# Patient Record
Sex: MALE | Race: WHITE | Employment: FULL TIME
[De-identification: names, ages, dates, MRNs, and addresses within clinical notes are randomized per-mention and may not be internally consistent; named-entity substitution may affect disease eponyms.]

---

## 2022-08-05 ENCOUNTER — TRANSCRIBE ORDERS (OUTPATIENT)
Dept: OTHER | Age: 52
End: 2022-08-05

## 2022-08-05 DIAGNOSIS — N52.9 ERECTILE DYSFUNCTION, UNSPECIFIED ERECTILE DYSFUNCTION TYPE: Primary | ICD-10-CM

## 2022-10-26 ENCOUNTER — OFFICE VISIT (OUTPATIENT)
Dept: UROLOGY | Facility: CLINIC | Age: 52
End: 2022-10-26
Payer: COMMERCIAL

## 2022-10-26 DIAGNOSIS — N52.9 ERECTILE DYSFUNCTION, UNSPECIFIED ERECTILE DYSFUNCTION TYPE: ICD-10-CM

## 2022-10-26 PROCEDURE — 99203 OFFICE O/P NEW LOW 30 MIN: CPT | Mod: 25 | Performed by: UROLOGY

## 2022-10-26 PROCEDURE — 51798 US URINE CAPACITY MEASURE: CPT | Performed by: UROLOGY

## 2022-10-26 RX ORDER — LIDOCAINE 50 MG/G
PATCH TOPICAL
COMMUNITY
Start: 2022-07-18

## 2022-10-26 RX ORDER — ATORVASTATIN CALCIUM 40 MG/1
20 TABLET, FILM COATED ORAL EVERY MORNING
COMMUNITY
Start: 2021-11-22

## 2022-10-26 RX ORDER — METOPROLOL TARTRATE 25 MG/1
0.5 TABLET, FILM COATED ORAL 2 TIMES DAILY
COMMUNITY
Start: 2022-01-21

## 2022-10-26 RX ORDER — LISINOPRIL 40 MG/1
20 TABLET ORAL EVERY MORNING
COMMUNITY
Start: 2021-11-22

## 2022-10-26 NOTE — NURSING NOTE
Jaime Anderson's goals for this visit include:   Chief Complaint   Patient presents with     New Patient     IPP consult, appt per pt       He requests these members of his care team be copied on today's visit information:       PCP: No Ref-Primary, Physician    Referring Provider:  Tor Sánchez  [address] [place] [id number]    There were no vitals taken for this visit.    Do you need any medication refills at today's visit?     Kinga Branch LPN on 10/26/2022 at 1:02 PM

## 2022-10-26 NOTE — PROGRESS NOTES
I am seeing Jaime Anderson in consultation from [facility] for evaluation of erectile dysfunction, referral for IPP placement.    HPI:  Jaime Anderson is a 52 year old male is a very nice man with complaints of erectile dysfunction for the last several years.  He's tried sildenafil, no response at all.  Cialis caused bad GERD.   Has been doing ICI, works well, but does not like the needles.    ED/Vascular disease risk factors:  HTN: yes  Hyperlipidemia: nl  Smoking: never.  DM: yes, last A1c 7  Cardiovascular disease: None known  Meds associated with ED that he's taking: metoprolol for history of SVT.  No coronary artery disease.  Anxiety/anger/depression:  No  Penile Plaques or curvature: minimal. Nothing new.      REVIEW OF SYSTEMS:  General: negative  Skin: negative  Eyes: negative  Ears/Nose/Throat: negative  Respiratory: negative  Cardiovascular: negative  Gastrointestinal: negative  Genitourinary: see HPI  Musculoskeletal: negative  Neurologic: negative  Psychiatric: negative  Hematologic/Lymphatic/Immunologic: negative  Endocrine: negative    PAST MEDICAL HX:  DM II, metformin    PAST SURG HX:  Past Surgical History:   Procedure Laterality Date     APPENDECTOMY       OPEN REDUCTION INTERNAL FIXATION ANKLE  11/29/2010    OPEN REDUCTION INTERNAL FIXATION ANKLE performed by PAMELA BENITEZ at [facility]    no history of inguinal hernia repair.  Has small umbilical hernia.    FAMILY HX:  No family history on file.    SOCIAL HX:  Social History     Tobacco Use     Smoking status: Never       MEDICATIONS:  Current Outpatient Medications   Medication Sig     Alprostadil, Vasodilator, 40 MCG SOLR      atorvastatin (LIPITOR) 40 MG tablet 20 mg     empagliflozin (JARDIANCE) 25 MG TABS tablet Take 0.5 tablets by mouth daily     lidocaine (LIDODERM) 5 % patch APPLY 1 PATCH TOPICALLY AS NEEDED FOR PAIN. WEAR FOR ONLY 12 HOURS THEN REMOVE FOR 12 HOURS.     lisinopril (ZESTRIL) 40 MG tablet 20 mg     LISINOPRIL 10 MG OR TABS  ONE DAILY     metFORMIN (GLUCOPHAGE) 1000 MG tablet 1,000 mg     metoprolol tartrate (LOPRESSOR) 25 MG tablet Take 0.5 tablets by mouth 2 times daily     Psyllium 33 % POWD TAKE 1 TABLESPOONFUL BY MOUTH EVERY DAY MIXED IN JUICE OR WATER AS DIRECTED FOR FIBER     semaglutide (OZEMPIC) 2 MG/1.5ML SOPN pen INJECT 0.5MG UNDER THE SKIN EVERY WEEK     No current facility-administered medications for this visit.       ALLERGIES:  Tuberculin purified protein derivative      GENERAL PHYSICAL EXAM:   Constitutional: No acute distress. Well nourished.   PSYCH: normal mood and affect.  NEURO: normal gait, no focal deficits.   EYES: anicteric, EOMI, PERR.  CARDIOPULMONARY: breathing non-labored, pulse regular rate/rhythm, no peripheral edema.  GI: Abdomen soft, non-tender, nondistended   MUSCULOSKELETAL: normal limb proportions, no muscle wasting, no contractures.  SKIN: Normal virilized hair distribution, no lesions, warts or rashes over genitalia, abdomen extremities or face.  HEME/LYMPH: no ecchymosis, no lymphadenopathy in groin, no lymphedema.     EXAM:  Phallus uncircumcised, meatus adequate, no plaques palpated.   Left testis descended , size is normal , consistency is normal . No intra-testicular masses.   Right testis descended , size is normal, consistency is normal . No intra-testicular masses.   Epididymes present, non-tender, not-enlarged.   Cord structures not remarkable.     Imaging/labs:  N/A     ASSESSMENT:     Erectile dysfunction.    PLAN:    I counseled the patient on the risks of penile implant surgery. These include, but are not limited to infection, scarring, penile shortening, damage to urethra and bladder, pain and erosion. I explained to him the risk of infection and the need for explantation in those cases; that other treatments for ED cannot be used after placing an implant, and that implants have a mechanical failure rate.  Discussed possible ectopic reservoir, possible glans necrosis/ tissue loss.   I answered all of his questions to the best of my ability and to the patient's satisfaction.     He would like to schedule IPP.  Surgery orders placed.    PVR 109cc today. No history of inguinal hernia repair.        Copied cc to Consulting provider [facility]  Rashad Sánchez    Thank-you for the kind consultation.  Nathanael Pugh MD     Urological Surgeon

## 2022-10-26 NOTE — LETTER
10/26/2022       RE: Jaime Anderson  [address] [place] [address]     Dear Colleague,    Thank you for referring your patient, Jaime Anderson, to the [facility]. Please see a copy of my visit note below.      I am seeing Jaime Anderson in consultation from [facility] for evaluation of erectile dysfunction, referral for IPP placement.    HPI:  Jaime Anderson is a 52 year old male is a very nice man with complaints of erectile dysfunction for the last several years.  He's tried sildenafil, no response at all.  Cialis caused bad GERD.   Has been doing ICI, works well, but does not like the needles.    ED/Vascular disease risk factors:  HTN: yes  Hyperlipidemia: nl  Smoking: never.  DM: yes, last A1c 7  Cardiovascular disease: None known  Meds associated with ED that he's taking: metoprolol for history of SVT.  No coronary artery disease.  Anxiety/anger/depression:  No  Penile Plaques or curvature: minimal. Nothing new.      REVIEW OF SYSTEMS:  General: negative  Skin: negative  Eyes: negative  Ears/Nose/Throat: negative  Respiratory: negative  Cardiovascular: negative  Gastrointestinal: negative  Genitourinary: see HPI  Musculoskeletal: negative  Neurologic: negative  Psychiatric: negative  Hematologic/Lymphatic/Immunologic: negative  Endocrine: negative    PAST MEDICAL HX:  DM II, metformin    PAST SURG HX:  Past Surgical History:   Procedure Laterality Date     APPENDECTOMY       OPEN REDUCTION INTERNAL FIXATION ANKLE  11/29/2010    OPEN REDUCTION INTERNAL FIXATION ANKLE performed by PAMELA BENITEZ at [facility]    no history of inguinal hernia repair.  Has small umbilical hernia.    FAMILY HX:  No family history on file.    SOCIAL HX:  Social History     Tobacco Use     Smoking status: Never       MEDICATIONS:  Current Outpatient Medications   Medication Sig     Alprostadil, Vasodilator, 40 MCG SOLR      atorvastatin (LIPITOR)  40 MG tablet 20 mg     empagliflozin (JARDIANCE) 25 MG TABS tablet Take 0.5 tablets by mouth daily     lidocaine (LIDODERM) 5 % patch APPLY 1 PATCH TOPICALLY AS NEEDED FOR PAIN. WEAR FOR ONLY 12 HOURS THEN REMOVE FOR 12 HOURS.     lisinopril (ZESTRIL) 40 MG tablet 20 mg     LISINOPRIL 10 MG OR TABS ONE DAILY     metFORMIN (GLUCOPHAGE) 1000 MG tablet 1,000 mg     metoprolol tartrate (LOPRESSOR) 25 MG tablet Take 0.5 tablets by mouth 2 times daily     Psyllium 33 % POWD TAKE 1 TABLESPOONFUL BY MOUTH EVERY DAY MIXED IN JUICE OR WATER AS DIRECTED FOR FIBER     semaglutide (OZEMPIC) 2 MG/1.5ML SOPN pen INJECT 0.5MG UNDER THE SKIN EVERY WEEK     No current facility-administered medications for this visit.       ALLERGIES:  Tuberculin purified protein derivative      GENERAL PHYSICAL EXAM:   Constitutional: No acute distress. Well nourished.   PSYCH: normal mood and affect.  NEURO: normal gait, no focal deficits.   EYES: anicteric, EOMI, PERR.  CARDIOPULMONARY: breathing non-labored, pulse regular rate/rhythm, no peripheral edema.  GI: Abdomen soft, non-tender, nondistended   MUSCULOSKELETAL: normal limb proportions, no muscle wasting, no contractures.  SKIN: Normal virilized hair distribution, no lesions, warts or rashes over genitalia, abdomen extremities or face.  HEME/LYMPH: no ecchymosis, no lymphadenopathy in groin, no lymphedema.     EXAM:  Phallus uncircumcised, meatus adequate, no plaques palpated.   Left testis descended , size is normal , consistency is normal . No intra-testicular masses.   Right testis descended , size is normal, consistency is normal . No intra-testicular masses.   Epididymes present, non-tender, not-enlarged.   Cord structures not remarkable.     Imaging/labs:  N/A     ASSESSMENT:     Erectile dysfunction.    PLAN:    I counseled the patient on the risks of penile implant surgery. These include, but are not limited to infection, scarring, penile shortening, damage to urethra and bladder,  pain and erosion. I explained to him the risk of infection and the need for explantation in those cases; that other treatments for ED cannot be used after placing an implant, and that implants have a mechanical failure rate.  Discussed possible ectopic reservoir, possible glans necrosis/ tissue loss.  I answered all of his questions to the best of my ability and to the patient's satisfaction.     He would like to schedule IPP.  Surgery orders placed.    PVR 109cc today. No history of inguinal hernia repair.        Copied cc to Consulting provider [facility]  Rashad Sánchez    Thank-you for the kind consultation.  Nathanael Pugh MD     Urological Surgeon       Again, thank you for allowing me to participate in the care of your patient.      Sincerely,    Nathanael Pugh MD

## 2022-10-26 NOTE — PATIENT INSTRUCTIONS
Surgery Instructions    Always follow your surgeon s instructions. If you don t, your surgery could be cancelled. Please use the following checklist.  Your surgery is on: The surgery scheduler will contact you within 1 week of your consult with the surgeon. If you do not hear from them, please call the clinic or RN Care Coordinator for your provider.    Time: Prearrival times can vary depending on location/type of surgery.  [place] - 2 hour pre-arrival  [facility] - 2 hour pre-arrival  [place] - 1 hour pre-arrival    Note:  These times may change. A nurse will call you before surgery to confirm. If you have not received a call or if you have more questions, please call us on the working day before your surgery:  [place]: [phone] or [phone] (9am to 5:30pm)  [facility]: [phone] (8am to 6pm)  [place]: [phone] (9am to 5pm)  [facility] [phone] (7am to 4pm)  Prior to surgery  Have a pre-op physical exam with your Primary Doctor within 30 days of surgery  Ask your doctor to send all of your results to the surgery center/hospital before surgery. Your doctor also may ask you to bring the results with you on the day of surgery.  Tell your doctor if:  You are allergic to latex or rubber (latex and rubber gloves are often used in medical care).  You are taking any medicines (including aspirin), vitamins, or herbal products. You may need to stop taking some medicines before surgery.  You have any medical problems (allergies, diabetes, or heart disease, for example).  You have a pacemaker or an AICD (automatic implanted cardiac defibrillator). If you do, please bring the ID card with you on the day of surgery.  People who smoke have a higher risk of infection after surgery. Ask your doctor how you can quit smoking.  If you Primary Doctor is not within the [email or web address] system, you will need to have your pre-op physical faxed to us to be scanned into your chart.  [place]: [phone]  or [phone]  [facility] [phone]  [facility]): [phone]  Complete pre-procedure COVID-19 Testing  What kind of COVID-19 test should I have?  At-home rapid antigen test  You must have a rapid-antigen test 1 to 2 days before your procedure (surgery) if you are:   - Over age 2 and   - Planning to go home the same day as your procedure (surgery)  Rapid antigen tests are not recommended for children age 2 and under. These patients should schedule a PCR test unless you have otherwise discussed performing an at-home antigen test with your surgeon.  You can buy this test at many pharmacy stores. Or, you may order a free test at covid.gov/tests.  If your test is negative: please take a photo of the test. Show the nurse the photo when you come in for your procedure. We can't accept rapid antigen tests that are more than 2 days old.  PCR lab test   You must have a PCR test in a lab within 4 days of your procedure (surgery) if you are:   - Age 2 or under (unless your surgeon gives you different instructions).   - Planning to stay overnight in the hospital   - Unable to find an at-home test.  While you don't have to use our lab, we recommend it. You can get your results more quickly and easily this way. To schedule a test at an [facility] lab, please call [phone]. Or, visit [email or web address].  If you have your test somewhere else, ask the testing location to fax your results to [phone].   If we don't get your results within 4 days of your procedure (surgery), we may have to delay or cancel your treatment.   We can't accept PCR tests that are more than 4 days old.  If your test shows you have COVID-19  If your test is positive, tell your surgeon's office right away. A positive test means that you have COVID-19.  We'll probably have to postpone your admission, surgery or procedure. Your care team will discuss this with you. We'll let you know  "what to do and when you can reschedule.  If your test shows you DON'T have COVID-19  A negative test result means you don't have COVID-19, but it is still possible that you might get it. It's rare, but sometimes the test result is wrong. Or, you could catch the virus after taking the test. There is also a very small chance that you could catch COVID-19 in the hospital or surgery center. [facility] has taken many steps to prevent this from happening.   To reduce your risk of getting COVID-19 before your procedure (surgery), follow the precautions below.  COVID-19 precautions  After your test and before your procedure, please follow these safety guidelines:  - Limit trips outside your home.  - Limit the number of people you see.  - Always wear a face covering outside your home.  - Use social distancing. Stay 6 feet away from others whenever you can.  - Wash your hands often.  Possible surgery delay   Like you, we want your surgery to happen when it's scheduled. But sometimes the hospital is so full that it's not safe for you to have your surgery. This is especially true during the pandemic. Your surgery may need to be rescheduled to a later date. If this happens, we will call and tell you.   Day of your surgery or procedure  Please come wearing a face covering that covers both your nose and mouth.  When you arrive, we'll ask you some questions to find out if you've had any exposures to COVID-19 or have any signs of COVID-19.   Ask your care team if you can have visitors. All visitors must wear face coverings and will be screened for exposure to, or signs of, COVID-19.   - The rules for visitors change often, depending on how much the virus is spreading. To learn more, see \"Visiting a Loved One in the Hospital during the COVID-19 Outbreak,\" at: [email or web address].   Call your insurance company. Ask if you need pre-approval for your surgery. If you do not have insurance, please let us know. If you wish to " speak to the , please alert the clinic staff so this can be arranged.  Arrange for someone to drive you home after surgery.  will need to be a responsible adult (18 years or older) that will provide transportation to and from surgery and stay in the waiting room during your surgery. You may not drive yourself or take public transportation to and from surgery.  Arrange for someone to stay with you for 24 hours after you go home. This person must be a responsible adult (18 years or older).  Call your surgeon or their nurse if there is any change in your health (cold, flu, infections, hospitalizations).  Do not smoke, drink alcohol, or take over-the-counter medicine for 24 hours before and after surgery.  If you take prescribed drugs, you may need to stop them until after the surgery.  Discuss what medications to take or not take prior to surgery with your Primary Doctor at your pre-op physical. Avoid over-the-counter blood-thinning medications such as Aspirin, Ibuprofen, vitamin E, or fish oil 7 days prior to surgery (unless otherwise directed by your Primary Doctor). Tylenol is a good alternative for mild pain relief prior to surgery.  Eating and drinking guidelines prior to surgery:  Stop all solid food consumption 8 hours prior to surgery  You may drink clear liquids up to 1 hour prior to surgery (water, fruit juices without pulp, jello, tea/coffee without creamer, sports drinks, clear-fat free broth (bouillon or consomme), popsicles (without milk, bits of fruit, or seeds/nuts)  Follow instructions given for showering or bathing before surgery.    Use 8 ounces of antiseptic surgical soap, like:  Hibiclens, Scrub Care, or Exidine  You can find it at your local pharmacy, clinic, or retail store. If you have trouble, ask your pharmacist to help you find the right substitute.  Please wash with one of the above soaps twice before coming to the hospital for your surgery. This will decrease bacteria  (germs) on your skin. It will also help reduce your chance of infection after surgery.  Items you will need for showerin newly washed washcloths  2 newly washed towels  8 ounces of one of the above soaps  Following these instructions:  The evening before surgery: Shower or bathe as you normally would, using your regular soap and a clean washcloth. Give special attention to places where your incision (surgical cut) or catheters will be. This includes your groin area. Rinse well. You may wash your hair with your regular shampoo. Next, wash your body with 4 ounces of the antiseptic soap. Use a clean, damp washcloth and gently clean your body (from the chin down). If your surgery involves your head, use the special soap on your head and scalp. Rinse well and dry off using a newly washed towel.  The morning of surgery: Repeat the same process as the evening shower.  Other suggestions:  Do not shave within 12 inches of your incision (surgical cut) area for at least 3 days before surgery. Shaving can make small cuts in the skin. This puts you at higher risk of infection.  Wear freshly washed pajamas or clothing after your evening shower.  Wear freshly washed clothes the day of surgery.  Wash and change your bed sheets the day before surgery to have clean bed sheets after your shower and when you get home from surgery.  If you have trouble washing all areas, make sure someone helps you.  Don't use any deodorant, lotion or powder after your shower.  Women who are menstruating should wear a fresh sanitary pad to the hospital.  Do not wear or add deodorant, cologne, lotion, makeup, nail polish or jewelry to surgery. If you wear fake nails, please remove at least one nail before coming to surgery (an oxygen monitor needs to be placed on your finger during surgery).  Bring these items to the surgery center/hospital:  Insurance card  Money for parking and co-pays, if needed  A list of all the medicines you take. Include  vitamins, minerals, herbs, and over-the-counter drugs.  Note any drug allergies.  A copy of your advance health care directive, if you have one. This tells us what treatment you would want--and who would make health care decisions--if you could no longer speak for yourself. You may request this form in advance or download it from [email or web address].  A case for glasses, contact lenses, hearing aids, or dentures.  Your inhaler or CPAP machine, if you use these at home.  Leave extra cash, jewelry, and other valuables at home.  When you arrive  When you get to the surgery center/hospital, you will:  Check in. If you are under age 18, you must be with a parent or legal guardian.  Sign consent forms, if you haven t already. These forms state that you know the risks and benefits of surgery. When you sign the forms, you give us permission to do the surgery. Do not sign them unless you understand what will happen during and after your surgery. If you have any questions about your surgery, ask to speak with your doctor before you sign the forms. If you don t understand the answers, ask again.  Receive a copy of the Patient s Bill of Rights. If you do not receive a copy, please ask for one.  Change into hospital clothes. Your belongings will be placed in a bag. We will return them to you after surgery.  Meet with the anesthesia provider. He or she will tell you what kind of anesthesia (medicine) will be used to keep you comfortable during surgery.  Remember: it s okay to remind doctors and nurses to wash their hands before touching you.  In most cases, your surgeon will use a marker to write his or her initials on the surgery site. This ensures that the exact site is operated on.  For safety reasons, we will ask you the same questions many times. For example, we may ask your name and birth date over and over again.  Friends and family can stay with you until it s time for surgery. While you re in surgery, they will be  in the waiting area. Please note that cell phones are not allowed in some patient care areas.  If you have questions about what will happen in the operating room, talk to your care team.  After surgery  We will move you to a recovery room, where we will watch you closely. If you have any pain or discomfort, tell your nurse. He or she will try to make you comfortable.  If you are staying overnight, we will move you to your hospital room after you are awake.  If you are going home, we will move you to another room. Friends and family may be able to join you. The length of time you spend in recovery depend on the type of medicine you received, your medical condition, the type of surgery you had, or your response to the anesthesia given during your procedure.  When you are discharged from the recovery room, the nurses will review instructions with you and your caregiver.  Please wash your hands every time you touch the wound or change bandages or dressings.  Do not submerge the wound in water.  You may not use a bathtub or hot tub until the wound is closed. The wait time frame is generally 2-3 weeks, but any open area can be a source of incoming bacteria, so it is better to be on the safe side and avoid water submersion until your wound is fully healed.  You may take a shower 24 hours after surgery. Double check with your surgeon if it is OK for water to run over the wound, whether it has been sutured, stapled, glued, or is open. You may gently wash the wound using the antiseptic soap provided for your pre-surgery showering (do not use a washcloth). Any mild soap will work as well.  Many surgical wounds will have small white strips of tape on them called steri-strips.  Do not remove these. The edges will curl and fall off within 7-10 days with normal showering.  If you are going home with sutures (stitches) or staples, you must return to the clinic to have them taken out, usually within 1-2 weeks. Some stitches are  dissolvable and do not require removal. Make sure to clarify with your surgeon or surgery nurse reviewing discharge paperwork what kind of sutures you have.  Signs and symptoms of infection include:  Fever, temperature over 101.5   F  Redness  Swelling  Increased pain  Green or yellow drainage which may or may not have a foul odor  Dealing with pain  A nurse will check your comfort level often during your stay. He or she will work with you to manage your pain.  Remember:  All pain is real. There are many ways to control pain. We can help you decide what works best for you.  Ask for pain medicine when you need it. Don t try to  tough it out --this can make you feel worse. Always take your medicine as ordered.  Medicine doesn t work the same for everyone. If your medicine isn t working, tell your nurse. There may be other medicines or treatments we can try.  Going home  We will let you know when you re ready to leave the surgery center or hospital. Before you leave, we will tell you how to care for yourself at home and prevent infections. If you do not understand something, please say so. We will answer any questions you have. We will then help you get ready to leave.  Remember, you must have a responsible adult (18 years or older) to stay with you 24 hours after you leave the hospital.  Take it easy when you get home. You will need some time to recover--you may be more tired than you realize at first. Rest and relax for at least the first 24 hours at home. You ll feel better and heal faster if you take good care of yourself.  Follow the discharge instructions that are given to you when you leave the surgery center or hospital  Please call the clinic if you experience any problems during regular clinic hours (Monday-Friday 8:00am-5:00pm).  If you experience problems during non-clinic hours, please call the [facility] on-call line at [phone] and ask the  to page the on-call Provider for your  specialty. The on-call Provider will call you back and can triage your symptoms and further advise. If you are having an emergency, always call 911 or seek immediate evaluation at the Emergency Room.  Locations  [facility]  Same-day surgery center - 2nd floor, check-in #5  [address] [place] [address]  [phone]  www.[facility].[place].[facility] ([facility])  [address] [place] [address]  [phone]   [email or web address]    [facility]  [address] [place] [address]  [phone] (patient registration)  [phone] (main line)  [email or web address]  [facility]  [address] [place] [address]  [facility], 3rd floor for check-in  [phone] (patient registration)  [phone] (main line)  [email or web address]  [facility]  [address] [place] [address]  [phone]  www.[email or web address]  [facility]  [address] [place] [address]  [phone]  www.[email or web address]  [facility]  [address] [place] [address]  [phone]  www.[email or web address]  [facility]  [address]  [place] [address]  [phone]  www.[email or web address]  [facility]  [address] [place] [address]  [phone]  [email or web address]  [facility]  [address] [place] [address]  [phone]  [email or web address]

## 2022-11-17 ENCOUNTER — TELEPHONE (OUTPATIENT)
Dept: UROLOGY | Facility: CLINIC | Age: 52
End: 2022-11-17

## 2022-11-17 NOTE — TELEPHONE ENCOUNTER
Called patient and let him know that message was sent to procedure . Patient thanked staff.     Kinga Branch LPN on 11/17/2022 at 3:10 PM

## 2022-11-17 NOTE — TELEPHONE ENCOUNTER
[facility] Call Center    Phone Message    May a detailed message be left on voicemail: yes     Reason for Call: Other: Jaime is calling to scheduled discussed surgery from 10/26 with Dr. Pugh. Please review and call back, thanks.     Action Taken: Message routed to:  Adult Clinics: Urology p [id number]    Travel Screening: Not Applicable

## 2022-11-18 ENCOUNTER — TELEPHONE (OUTPATIENT)
Dept: UROLOGY | Facility: CLINIC | Age: 52
End: 2022-11-18

## 2022-11-18 NOTE — TELEPHONE ENCOUNTER
Patient is tentatively scheduled for surgery with Dr. Pugh      Spoke with: Patient via phone      Date of Surgery: Tuesday February 07, 2023     Location: [place] OR      Informed patient they will need an adult : Yes     Pre-op: Yes      H&P: Patient to schedule     Pre-procedure COVID-19 Test: Friday February 03, 2023 at [facility]     Additional imaging/appointments:     Post-op: 2 weeks to be scheduled at [facility] per patient     Additional comments: Wait list with OR for date, patient aware       Surgery packet: Sent via mail 11/18/22    Patient is aware that surgery time is tentative to change and to expect a call 3-1 business days from Pre Admission Nursing for instructions and arrival time

## 2022-11-18 NOTE — TELEPHONE ENCOUNTER
Message sent to scheduling team with request to contact patient to schedule a 2 week post op with Dr. Pugh on 2/22/23.    Palak Chilel RN, BSN

## 2022-12-06 ENCOUNTER — TRANSFERRED RECORDS (OUTPATIENT)
Dept: HEALTH INFORMATION MANAGEMENT | Facility: CLINIC | Age: 52
End: 2022-12-06

## 2022-12-06 LAB
ALT SERPL-CCNC: 29 U/L (ref 13–61)
AST SERPL-CCNC: 23 U/L (ref 15–37)
CHOLESTEROL (EXTERNAL): 117 MG/DL (ref 0–200)
CREATININE (EXTERNAL): 1 MG/DL (ref 0.7–1.2)
GLUCOSE (EXTERNAL): 178 MG/DL (ref 74–106)
HBA1C MFR BLD: 7.9 % (ref 4–6)
HDLC SERPL-MCNC: 34 MG/DL
HIV 1&2 EXT: NORMAL
LDL CHOLESTEROL CALCULATED (EXTERNAL): 59 MG/DL
NON HDL CHOLESTEROL (EXTERNAL): 83 MG/DL
POTASSIUM (EXTERNAL): 4.5 MMOL/L (ref 3.5–5)
TRIGLYCERIDES (EXTERNAL): ABNORMAL MG/DL

## 2022-12-12 ENCOUNTER — TRANSFERRED RECORDS (OUTPATIENT)
Dept: HEALTH INFORMATION MANAGEMENT | Facility: CLINIC | Age: 52
End: 2022-12-12

## 2022-12-13 ENCOUNTER — TRANSFERRED RECORDS (OUTPATIENT)
Dept: HEALTH INFORMATION MANAGEMENT | Facility: CLINIC | Age: 52
End: 2022-12-13

## 2022-12-20 ENCOUNTER — TRANSFERRED RECORDS (OUTPATIENT)
Dept: HEALTH INFORMATION MANAGEMENT | Facility: CLINIC | Age: 52
End: 2022-12-20

## 2023-01-09 ENCOUNTER — TELEPHONE (OUTPATIENT)
Dept: UROLOGY | Facility: CLINIC | Age: 53
End: 2023-01-09

## 2023-01-09 DIAGNOSIS — N52.9 ERECTILE DYSFUNCTION, UNSPECIFIED ERECTILE DYSFUNCTION TYPE: Primary | ICD-10-CM

## 2023-01-09 NOTE — TELEPHONE ENCOUNTER
----- Message from Nathanael Pugh MD sent at 1/9/2023  8:33 AM CST -----  Please contact him, he has questions on what he needs for his preop  [facility]    Thanks  MARY KATE

## 2023-01-09 NOTE — TELEPHONE ENCOUNTER
Discussed surgery details with the patient. No Covid needed for his procedure will cancel. Patient wanted to know if his annual physical would be okay for the procedure 2/7/23 with Dr Puhg. explained to patient the protocol and he will contact Dr. YOJANA Chavez at the [facility] she will need a pre-op physical any time now. Patient also instructed not to use his semaglutide the Saturday before is surgery. (per pre-operative guidelines ) Patient injects semaglutide Saturday's.  Patient also takes Jardiance daily and will need to hold 3 days prior to surgery.   Patient reports his A1C was under 7 last time checked.   Patient verbalized understanding  He will have his urinalysis done at his pre-op physical.    Georgette Matthews, RN, BSN  Care Coordinator Urology

## 2023-01-25 ENCOUNTER — TELEPHONE (OUTPATIENT)
Dept: UROLOGY | Facility: CLINIC | Age: 53
End: 2023-01-25

## 2023-01-25 NOTE — TELEPHONE ENCOUNTER
[facility] Call Center    Phone Message    May a detailed message be left on voicemail: no     Reason for Call: pt called to schedule appt. [facility]. Pt would like a call back to schedule pre-op appt with [facility]. Thank you.    Action Taken: Other: URO    Travel Screening: Not Applicable

## 2023-01-25 NOTE — TELEPHONE ENCOUNTER
Per chart review, patient is scheduled for a virtual PAC assessment on 1/31/23. Discussed with Dr. Pugh, patient can complete the UA anytime, up to a month before surgery. Future UA/UC order in place.    Called and spoke to patient. Per patient, the covid lab appointment was going to be cancelled and the urine sample lab would be completed instead. Covid lab appointment cancelled by writer. Lab only appointment for UA scheduled for 2/3/23 at 8:15am.     Palak Chilel RN, BSN

## 2023-01-26 NOTE — TELEPHONE ENCOUNTER
FUTURE VISIT INFORMATION      SURGERY INFORMATION:    Date: 23    Location: ur or    Surgeon:  Nathanael Pugh MD    Anesthesia Type:  general    Procedure: INSERTION of COLOPLAST INFLATABLE PENILE PROSTHESIS  RECORDS REQUESTED FROM:       Pertinent Medical History: hypertension    Most recent EKG+ Tracin/26/10

## 2023-01-31 ENCOUNTER — VIRTUAL VISIT (OUTPATIENT)
Dept: SURGERY | Facility: CLINIC | Age: 53
End: 2023-01-31
Payer: COMMERCIAL

## 2023-01-31 ENCOUNTER — PRE VISIT (OUTPATIENT)
Dept: SURGERY | Facility: CLINIC | Age: 53
End: 2023-01-31

## 2023-01-31 ENCOUNTER — ANESTHESIA EVENT (OUTPATIENT)
Dept: SURGERY | Facility: CLINIC | Age: 53
DRG: 709 | End: 2023-01-31
Payer: COMMERCIAL

## 2023-01-31 DIAGNOSIS — Z01.818 PRE-OP EXAMINATION: Primary | ICD-10-CM

## 2023-01-31 DIAGNOSIS — N52.9 ERECTILE DYSFUNCTION, UNSPECIFIED ERECTILE DYSFUNCTION TYPE: ICD-10-CM

## 2023-01-31 PROCEDURE — 99203 OFFICE O/P NEW LOW 30 MIN: CPT | Mod: 95 | Performed by: PHYSICIAN ASSISTANT

## 2023-01-31 RX ORDER — MULTIPLE VITAMINS W/ MINERALS TAB 9MG-400MCG
1 TAB ORAL EVERY MORNING
COMMUNITY

## 2023-01-31 RX ORDER — GLIPIZIDE 5 MG/1
5 TABLET ORAL DAILY
COMMUNITY

## 2023-01-31 ASSESSMENT — ENCOUNTER SYMPTOMS: SEIZURES: 0

## 2023-01-31 ASSESSMENT — LIFESTYLE VARIABLES: TOBACCO_USE: 0

## 2023-01-31 NOTE — PATIENT INSTRUCTIONS
Preparing for Your Surgery      Name:  Jaime Anderson   MRN:  [id number]   :  1970   Today's Date:  2023       Arriving for surgery:  Surgery date:  23  Arrival time:  12 Noon     Surgeries and procedures: Adult patients can have 2 visitors all through the surgery process.     Visiting hours: 8 a.m. to 8:30 p.m.     Hospital: Adult patients and children under age 18 can have 4 visitor at a time     No visitors under the age of 5 are allowed for hospital patients.  Double occupancy rooms: Patients can have only two visitors at a time.     Patients with disabilities: Can have a support person with them (family member, service provider     Or someone well informed about their needs) plus the allowed number of visitors     Patients confirmed or suspected to have symptoms of COVID 19 or flu:     No visitors allowed for adult patients.   Children (under age 18) can have 1 named visitor.     People who are sick or showing symptoms of COVID 19 or flu:    Are not allowed to visit patients--we can only make exceptions in special situations.       Please follow these guidelines for your visit:   Arrive wearing a mask over your mouth and nose; we will give you a medical mask to wear    If you arrive wearing a cloth mask.   Keep it on during your entire visit, even when in patient's room.   If you don't wear a mask we'll ask you to leave.     Clean your hands with alcohol hand . Do this when you arrive at and leave the building and patient room,    And again after you touch your mask or anything in the room.     You can t visit if you have a fever, cough, shortness of breath, muscle aches, headaches, sore throat    Or diarrhea      Stay 6 feet away from others during your visit and between visits     Go directly to and from the room you are visiting.     Stay in the patient s room during your visit. Limit going to other places in the hospital as much as possible     Leave bags and jackets at home or in  the car.     For everyone s health, please don t come and go during your visit. That includes for smoking   during your visit.     Please come to:     [facility] [address]  (Address takes you to the [facility].)  [address] [place]  [address]    -Parking is available in front of [facility] in the Green Lot.    -Proceed to the 3rd floor, check in at the Adult Surgery Waiting Lounge. [phone]    If an escort is needed stop at the Information Desk in the lobby. Inform the information person that you are here for surgery. An escort to the Adult Surgery Waiting Lounge will be provided.    What can I eat or drink?  -  You may eat and drink normally up to 8 hours prior to arrival time. (Until 4 am)  -  You may have clear liquids until 2 hours prior to arrival time. (Until 10 am)    Examples of clear liquids:  Water  Clear broth  Juices (apple, white grape, white cranberry  and cider) without pulp  Noncarbonated, powder based beverages  (lemonade and Benjamín-Aid)  Sodas (Sprite, 7-Up, ginger ale and seltzer)  Coffee or tea (without milk or cream)  Gatorade    -  No Alcohol for at least 24 hours before surgery.     Which medicines can I take?  Hold Aspirin for 7 days before surgery.   Hold Multivitamins for 7 days before surgery.  Hold Supplements for 7 days before surgery.  Hold Ibuprofen (Advil, Motrin) for 1 day before surgery--unless otherwise directed by surgeon.  Hold Naproxen (Aleve) for 4 days before surgery.     Hold Empagliflozin (Jardiance) for 3 days prior to surgery. Take the last Jardiance on 2-3-23, and then hold until surgery.     Hold Alprostadil for 1 day prior to surgery.    -  DO NOT take these medications the day of surgery:  Lisinopril, Metformin, Glipizide, Psyllium, Bioflavonoid (Vitamin C),     -  PLEASE TAKE these medications the day of surgery:  Atorvastatin (Lipitor), Metoprolol,   Acetaminophen  (Tylenol) if needed    How do I prepare myself?  -  Bring CPAP.  - Please take 2 showers before surgery using Scrubcare or Hibiclens soap.    Use this soap only from the neck to your toes.     Leave the soap on your skin for one minute--then rinse thoroughly.      You may use your own shampoo and conditioner. No other hair products.   - Please remove all jewelry and body piercings.  - No lotions, deodorants or fragrance.  - Bring your ID and insurance card.    -If you have a Deep Brain Stimulator, Spinal Cord Stimulator, or any Neuro Stimulator device---you must bring the remote control to the hospital.      ALL PATIENTS GOING HOME THE SAME DAY OF SURGERY ARE REQUIRED TO HAVE A RESPONSIBLE ADULT TO DRIVE AND BE IN ATTENDANCE WITH THEM FOR 24 HOURS FOLLOWING SURGERY.    Covid testing policy as of 12/06/2022  Your surgeon will notify and schedule you for a COVID test if one is needed before surgery--please direct any questions or COVID symptoms to your surgeon      Questions or Concerns:    - For any questions regarding the day of surgery or your hospital stay, please contact the Pre Admission Nursing Office at [phone].       - If you have health changes between today and your surgery, please call your surgeon.       - For questions after surgery, please call your surgeons office.

## 2023-01-31 NOTE — PROGRESS NOTES
Jaime is a 52 year old who is being evaluated via a billable video visit.      How would you like to obtain your AVS? Email [email or web address]            Subjective   Jaime is a 52 year old; presenting for the following health issues:  Pre-Op Exam (/)      HPI     Review of Systems     Physical Exam     IMAN Stevenson LPN

## 2023-01-31 NOTE — H&P
Pre-Operative H & P     CC:  Preoperative exam to assess for increased cardiopulmonary risk while undergoing surgery and anesthesia.    Date of Encounter: 1/31/2023  Primary Care Physician:  No Ref-Primary, Physician     Reason for visit:   Encounter Diagnoses   Name Primary?     Erectile dysfunction, unspecified erectile dysfunction type      Pre-op examination Yes       HPI  Jaime Anderson is a 52 year old male who presents for pre-operative H & P in preparation for  Procedure Information     Case: [id number] Date/Time: 02/07/23 1355    Procedure: INSERTION of COLOPLAST INFLATABLE PENILE PROSTHESIS (Penis)    Anesthesia type: General    Diagnosis: Erectile dysfunction, unspecified erectile dysfunction type [N52.9]    Pre-op diagnosis: Erectile dysfunction, unspecified erectile dysfunction type [N52.9]    Location:  OR  /  OR    Providers: Nathanael Pugh MD          Patient is a 82-year-old man with a past medical history significant for hypertension, hyper anemia, obstructive sleep apnea, type 2 diabetes, obesity, hearing loss, tinnitus, PTSD and erectile dysfunction.  [facility] by Dr. Pugh in the office on 10/26/2022.  During that visit they discussed treatment options and the patient is scheduled for the procedure as above.    History is obtained from the patient and chart review    Hx of abnormal bleeding or anti-platelet use: none      Past Medical History  Past Medical History:   Diagnosis Date     Erectile dysfunction, unspecified erectile dysfunction type      HLD (hyperlipidemia)      HTN (hypertension)      Hypogonadism male      Obesity      ANNE (obstructive sleep apnea)      PTSD (post-traumatic stress disorder)      Type 2 diabetes mellitus (H)        Past Surgical History  Past Surgical History:   Procedure Laterality Date     APPENDECTOMY       OPEN REDUCTION INTERNAL FIXATION ANKLE  11/29/2010    OPEN REDUCTION INTERNAL FIXATION ANKLE performed by  PAMELA BENITEZ at  OR       Prior to Admission Medications  Current Outpatient Medications   Medication Sig Dispense Refill     Alprostadil, Vasodilator, 40 MCG SOLR by INTRACAVERNOSAL route daily as needed       atorvastatin (LIPITOR) 40 MG tablet Take 20 mg by mouth every morning       Bioflavonoid Products (VITAMIN C) CHEW Take by mouth every morning       empagliflozin (JARDIANCE) 25 MG TABS tablet Take 0.5 tablets by mouth every morning       lisinopril (ZESTRIL) 40 MG tablet Take 20 mg by mouth every morning       metFORMIN (GLUCOPHAGE) 1000 MG tablet Take 1,000 mg by mouth 2 times daily (with meals)       metoprolol tartrate (LOPRESSOR) 25 MG tablet Take 0.5 tablets by mouth 2 times daily       multivitamin w/minerals (MULTI-VITAMIN) tablet Take 1 tablet by mouth every morning       semaglutide (OZEMPIC) 2 MG/1.5ML SOPN pen 0.5 mg every 7 days Saturday       lidocaine (LIDODERM) 5 % patch APPLY 1 PATCH TOPICALLY AS NEEDED FOR PAIN. WEAR FOR ONLY 12 HOURS THEN REMOVE FOR 12 HOURS.       LISINOPRIL 10 MG OR TABS ONE DAILY (Patient taking differently: No sig reported) 30 5     Psyllium 33 % POWD TAKE 1 TABLESPOONFUL BY MOUTH EVERY DAY MIXED IN JUICE OR WATER AS DIRECTED FOR FIBER         Allergies  Allergies   Allergen Reactions     Tuberculin Purified Protein Derivative Swelling       Social History  Social History     Socioeconomic History     Marital status: Single     Spouse name: Not on file     Number of children: Not on file     Years of education: Not on file     Highest education level: Not on file   Occupational History     Not on file   Tobacco Use     Smoking status: Never     Smokeless tobacco: Never   Substance and Sexual Activity     Alcohol use: Yes     Drug use: Never     Sexual activity: Not on file   Other Topics Concern     Parent/sibling w/ CABG, MI or angioplasty before 65F 55M? Not Asked   Social History Narrative     Not on file     Social Determinants of Health     Financial Resource  Strain: Not on file   Food Insecurity: Not on file   Transportation Needs: Not on file   Physical Activity: Not on file   Stress: Not on file   Social Connections: Not on file   Intimate Partner Violence: Not on file   Housing Stability: Not on file       Family History  No family history on file.    Review of Systems  The complete review of systems is negative other than noted in the HPI or here.   Anesthesia Evaluation   Pt has had prior anesthetic. Type: General.    No history of anesthetic complications       ROS/MED HX  ENT/Pulmonary: Comment: SNHL  Tinnitus     (+) sleep apnea, uses CPAP,  (-) tobacco use   Neurologic:  - neg neurologic ROS  (-) no seizures, no CVA and no TIA   Cardiovascular:     (+) Dyslipidemia hypertension-----    METS/Exercise Tolerance: 4 - Raking leaves, gardening    Hematologic:  - neg hematologic  ROS     Musculoskeletal: Comment: Left foot tendon rupture      GI/Hepatic:  - neg GI/hepatic ROS  (-) GERD   Renal/Genitourinary: Comment: ED      Endo:     (+) type II DM, Last HgA1c: 7.9, date: 12/6/22, Not using insulin, - not using insulin pump. Obesity,     Psychiatric/Substance Use:     (+) psychiatric history other (comment) (PTSD)     Infectious Disease:  - neg infectious disease ROS     Malignancy:  - neg malignancy ROS     Other:  - neg other ROS          Virtual visit -  No vitals were obtained    Physical Exam  Constitutional: Awake, alert, cooperative, no apparent distress, and appears stated age.  Eyes: Pupils equal  HENT: Normocephalic  Respiratory: non labored breathing   Neurologic: Awake, alert, oriented to name, place and time.   Neuropsychiatric: Calm, cooperative. Normal affect.      Prior Labs/Diagnostic Studies   All labs and imaging personally reviewed   HEMOGLOBIN A1C HEMOGLOBIN A1C/HEMOGLOBIN.TOTAL IN BLOOD 7.9  4.0 - 6.0 12/06/2022 H     CBC & DIFF LEUKOCYTES [#/VOLUME] IN BLOOD BY AUTOMATED COUNT 10.66  4.0 - 11.0 12/06/2022     Specimen Type: BLOOD  Comment:  Automated Differential Performed  Ordering Provider: BRENDA BAUGH  Report Released Date/Time: Oct 26, 2022 03:13 PM  Reporting Lab: [facility] [address]  Performing Lab: [facility] [address] [facility]   CBC & DIFF ERYTHROCYTES [#/VOLUME] IN BLOOD BY AUTOMATED COUNT 5.13  4.6 - 6.2 12/06/2022     Specimen Type: BLOOD  Comment: Automated Differential Performed  Ordering Provider: BRENDA BAUGH  Report Released Date/Time: Oct 26, 2022 03:13 PM  Reporting Lab: [facility] [address]  Performing Lab: [facility] [address] [facility]   CBC & DIFF HEMOGLOBIN [MASS/VOLUME] IN BLOOD 15.8  13.5 - 17.9 12/06/2022     Specimen Type: BLOOD  Comment: Automated Differential Performed  Ordering Provider: BRENDA BAUGH  Report Released Date/Time: Oct 26, 2022 03:13 PM  Reporting Lab: [facility] [address]  Performing Lab: [facility] [address] [facility]   CBC & DIFF HEMATOCRIT [VOLUME FRACTION] OF BLOOD BY AUTOMATED COUNT 48.0  41 - 54 12/06/2022     Specimen Type: BLOOD  Comment: Automated Differential Performed  Ordering Provider: BRENDA BAUGH  Report Released Date/Time: Oct 26, 2022 03:13 PM  Reporting Lab: [facility] [address]  Performing Lab: [facility] [address] [facility]   CBC & DIFF MCV [ENTITIC VOLUME] BY AUTOMATED COUNT 93.6  80 - 100 12/06/2022     Specimen Type: BLOOD  Comment: Automated Differential Performed  Ordering Provider: BRENDA BAUGH  Report Released Date/Time: Oct 26, 2022 03:13 PM  Reporting Lab: [facility] [address]  Performing Lab: [facility] [address] [place]  [facility]   CBC & DIFF MCH [ENTITIC MASS] BY AUTOMATED COUNT 30.8  27 - 33 12/06/2022     Specimen Type: BLOOD  Comment: Automated Differential Performed  Ordering Provider: BRENDA BAUGH  Report Released Date/Time: Oct 26, 2022 03:13 PM  Reporting Lab: [facility] [address]  Performing Lab: [facility] [address] [facility]   CBC & DIFF MCHC [MASS/VOLUME] BY AUTOMATED COUNT 32.9  32.0 - 37.5 12/06/2022     Specimen Type: BLOOD  Comment: Automated Differential Performed  Ordering Provider: BRENDA BAUGH  Report Released Date/Time: Oct 26, 2022 03:13 PM  Reporting Lab: [facility] [address]  Performing Lab: [facility] [address] [facility]   CBC & DIFF PLATELETS [#/VOLUME] IN BLOOD BY AUTOMATED COUNT 294  150 - 400 12/06/2022     Specimen Type: BLOOD  Comment: Automated Differential Performed  Ordering Provider: BRENDA BAUGH  Report Released Date/Time: Oct 26, 2022 03:13 PM  Reporting Lab: [facility] [address]  Performing Lab: [facility] [address] [facility]   CBC & DIFF PLATELET MEAN VOLUME [ENTITIC VOLUME] IN BLOOD BY AUTOMATED COUNT 9.4  7.4 - 10.4 12/06/2022     Specimen Type: BLOOD  Comment: Automated Differential Performed  Ordering Provider: BRENDA BAUGH  Report Released Date/Time: Oct 26, 2022 03:13 PM  Reporting Lab: [facility] [address]  Performing Lab: [facility] [address] [facility]   CBC & DIFF NEUTROPHILS/100 LEUKOCYTES IN BLOOD BY MANUAL COUNT 56.0    12/06/2022     Specimen Type: BLOOD  Comment: Automated Differential Performed  Ordering Provider: BRENDA BAUGH  Report Released Date/Time: Oct 26, 2022 03:13 PM  Reporting Lab: [facility] [address]  Performing Lab: [facility] [address] [facility]   CBC & DIFF LYMPHOCYTES/100 LEUKOCYTES IN BLOOD BY MANUAL COUNT 35.0    12/06/2022     Specimen Type: BLOOD  Comment: Automated Differential Performed  Ordering Provider: BRENDA BAUGH  Report Released Date/Time: Oct 26, 2022 03:13 PM  Reporting Lab: [facility] [address]  Performing Lab: [facility] [address] [facility]   CBC & DIFF MONOCYTES/100 LEUKOCYTES IN BLOOD BY AUTOMATED COUNT 4.9    12/06/2022     Specimen Type: BLOOD  Comment: Automated Differential Performed  Ordering Provider: BRENDA BAUGH  Report Released Date/Time: Oct 26, 2022 03:13 PM  Reporting Lab: [facility] [address]  Performing Lab: [facility] [address] [facility]   CBC & DIFF EOSINOPHILS/100 LEUKOCYTES IN BLOOD BY AUTOMATED COUNT 2.8    12/06/2022     Specimen Type: BLOOD  Comment: Automated Differential Performed  Ordering Provider: BRENDA BAUGH  Report Released Date/Time: Oct 26, 2022 03:13 PM  Reporting Lab: [facility] [address]  Performing Lab: [facility] [address] [facility]   CBC & DIFF BASOPHILS/100 LEUKOCYTES IN BLOOD BY MANUAL COUNT 0.9    12/06/2022     Specimen Type: BLOOD  Comment: Automated Differential Performed  Ordering Provider: BRENDA BAUGH  Report Released Date/Time: Oct 26, 2022 03:13 PM  Reporting Lab: [facility] [address]  Performing Lab: [facility] [address] [facility]   CBC & DIFF ERYTHROCYTE DISTRIBUTION WIDTH [RATIO] BY AUTOMATED COUNT 12.4  11.5 - 14.5 12/06/2022     Specimen Type: BLOOD  Comment: Automated  Differential Performed  Ordering Provider: BRENDA BAUGH  Report Released Date/Time: Oct 26, 2022 03:13 PM  Reporting Lab: [facility] [address]  Performing Lab: [facility] [address] [facility]   CBC & DIFF LYMPHOCYTES [#/VOLUME] IN BLOOD BY AUTOMATED COUNT 3.73  1.0 - 4.0 12/06/2022     Specimen Type: BLOOD  Comment: Automated Differential Performed  Ordering Provider: BRENDA BAUGH  Report Released Date/Time: Oct 26, 2022 03:13 PM  Reporting Lab: [facility] [address]  Performing Lab: [facility] [address] [facility]   CBC & DIFF MONOCYTES [#/VOLUME] IN BLOOD BY AUTOMATED COUNT 0.52  0.1 - 1.0 12/06/2022     Specimen Type: BLOOD  Comment: Automated Differential Performed  Ordering Provider: BRENDA BAUGH  Report Released Date/Time: Oct 26, 2022 03:13 PM  Reporting Lab: [facility] [address]  Performing Lab: [facility] [address] [facility]   CBC & DIFF NEUTROPHILS [#/VOLUME] IN BLOOD BY AUTOMATED COUNT 5.97  2.0 - 7.7 12/06/2022     Specimen Type: BLOOD  Comment: Automated Differential Performed  Ordering Provider: BRENDA BAUGH  Report Released Date/Time: Oct 26, 2022 03:13 PM  Reporting Lab: [facility] [address]  Performing Lab: [facility] [address] [facility]   CBC & DIFF EOSINOPHILS [#/VOLUME] IN BLOOD BY AUTOMATED COUNT 0.30  0 - 0.5 12/06/2022     Specimen Type: BLOOD  Comment: Automated Differential Performed  Ordering Provider: BRENDA BAUGH  Report Released Date/Time: Oct 26, 2022 03:13 PM  Reporting Lab: [facility] [address]  Performing Lab: [facility] [address]  [facility]   CBC & DIFF BASOPHILS [#/VOLUME] IN BLOOD BY AUTOMATED COUNT 0.10  0 - 0.2 12/06/2022     Specimen Type: BLOOD  Comment: Automated Differential Performed  Ordering Provider: RBENDA BAUGH  Report Released Date/Time: Oct 26, 2022 03:13 PM  Reporting Lab: [facility] [address]  Performing Lab: [facility] [address] [facility]   CBC & DIFF IG(META,MYELO,PRO) 0.4    12/06/2022     Specimen Type: BLOOD  Comment: Automated Differential Performed  Ordering Provider: BRENDA BAUGH  Report Released Date/Time: Oct 26, 2022 03:13 PM  Reporting Lab: [facility] [address]  Performing Lab: [facility] [address] [facility]   CBC & DIFF IMMATURE GRANULOCYTES [PRESENCE] IN BLOOD BY AUTOMATED COUNT 0.04  0 - 0.1 12/06/2022     Specimen Type: BLOOD  Comment: Automated Differential Performed  Ordering Provider: BRENDA BAUGH  Report Released Date/Time: Oct 26, 2022 03:13 PM  Reporting Lab: [facility] [address]  Performing Lab: [facility] [address] [facility]   COMPREHENSIVE METABOLIC PANEL+MG CREATININE [MASS/VOLUME] IN SERUM OR PLASMA 1.0  0.7 - 1.2 12/06/2022     Specimen Type: PLASMA  No comment entered.  Ordering Provider: BRENDA BAUGH  Report Released Date/Time: Oct 26, 2022 03:13 PM  Reporting Lab: [facility] [address]  Performing Lab: [facility] [address] [facility]   COMPREHENSIVE METABOLIC PANEL+MG UREA NITROGEN [MASS/VOLUME] IN SERUM OR PLASMA 22  8 - 26 12/06/2022     Specimen Type: PLASMA  No comment entered.  Ordering Provider: BRENDA BAUGH  Report Released Date/Time: Oct 26, 2022 03:13 PM  Reporting Lab: [facility] [address]  Performing Lab: [facility] [address] [facility]   COMPREHENSIVE METABOLIC PANEL+MG GLUCOSE [MASS/VOLUME] IN SERUM OR PLASMA 178  70 - 100 12/06/2022 H   Specimen Type: PLASMA  No comment entered.  Ordering Provider: BRENDA BAUGH  Report Released Date/Time: Oct 26, 2022 03:13 PM  Reporting Lab: [facility] [address]  Performing Lab: [facility] [address] [facility]   COMPREHENSIVE METABOLIC PANEL+MG SODIUM [MOLES/VOLUME] IN SERUM OR PLASMA 141  136 - 145 12/06/2022     Specimen Type: PLASMA  No comment entered.  Ordering Provider: BRENDA BAUGH  Report Released Date/Time: Oct 26, 2022 03:13 PM  Reporting Lab: [facility] [address]  Performing Lab: [facility] [address] [facility]   COMPREHENSIVE METABOLIC PANEL+MG POTASSIUM [MOLES/VOLUME] IN SERUM OR PLASMA 4.5  3.5 - 5.1 12/06/2022     Specimen Type: PLASMA  No comment entered.  Ordering Provider: BRENDA BAUGH  Report Released Date/Time: Oct 26, 2022 03:13 PM  Reporting Lab: [facility] [address]  Performing Lab: [facility] [address] [facility]   COMPREHENSIVE METABOLIC PANEL+MG CHLORIDE [MOLES/VOLUME] IN SERUM OR PLASMA 110  98 - 107 12/06/2022 H   Specimen Type: PLASMA  No comment entered.  Ordering Provider: BRENDA BAUGH  Report Released Date/Time: Oct 26, 2022 03:13 PM  Reporting Lab: [facility] [address]  Performing Lab: [facility] [address] [facility]   COMPREHENSIVE METABOLIC PANEL+MG CARBON DIOXIDE, TOTAL [MOLES/VOLUME] IN SERUM OR PLASMA 22  22 - 29 12/06/2022     Specimen Type: PLASMA  No comment entered.  Ordering  Provider: BRENDA BAUGH  Report Released Date/Time: Oct 26, 2022 03:13 PM  Reporting Lab: [facility] [address]  Performing Lab: [facility] [address] [facility]   COMPREHENSIVE METABOLIC PANEL+MG CALCIUM [MASS/VOLUME] IN SERUM OR PLASMA 9.3  8.4 - 10.2 12/06/2022     Specimen Type: PLASMA  No comment entered.  Ordering Provider: BRENDA BAUGH  Report Released Date/Time: Oct 26, 2022 03:13 PM  Reporting Lab: [facility] [address]  Performing Lab: [facility] [address] [facility]   COMPREHENSIVE METABOLIC PANEL+MG PROTEIN [MASS/VOLUME] IN SERUM OR PLASMA 7.6  6.0 - 8.3 12/06/2022     Specimen Type: PLASMA  No comment entered.  Ordering Provider: BRENDA BAUGH  Report Released Date/Time: Oct 26, 2022 03:13 PM  Reporting Lab: [facility] [address]  Performing Lab: [facility] [address] [facility]   COMPREHENSIVE METABOLIC PANEL+MG ALBUMIN [MASS/VOLUME] IN SERUM OR PLASMA 4.2  3.5 - 5.2 12/06/2022     Specimen Type: PLASMA  No comment entered.  Ordering Provider: BRENDA BAUGH  Report Released Date/Time: Oct 26, 2022 03:13 PM  Reporting Lab: [facility] [address]  Performing Lab: [facility] [address] [facility]   COMPREHENSIVE METABOLIC PANEL+MG BILIRUBIN.TOTAL [MASS/VOLUME] IN SERUM OR PLASMA 0.3  0.2 - 1.2 12/06/2022     Specimen Type: PLASMA  No comment entered.  Ordering Provider: BRENDA BAUGH  Report Released Date/Time: Oct 26, 2022 03:13 PM  Reporting Lab: [facility] [address]  Performing Lab: [facility] [address] [facility]   COMPREHENSIVE METABOLIC PANEL+MG MAGNESIUM  [MASS/VOLUME] IN SERUM OR PLASMA 2.0  1.6 - 2.6 12/06/2022     Specimen Type: PLASMA  No comment entered.  Ordering Provider: BRENDA BAUGH  Report Released Date/Time: Oct 26, 2022 03:13 PM  Reporting Lab: [facility] [address]  Performing Lab: [facility] [address] [facility]   COMPREHENSIVE METABOLIC PANEL+MG ANION GAP IN SERUM OR PLASMA 9  5 - 15 12/06/2022     Specimen Type: PLASMA  No comment entered.  Ordering Provider: BRENDA BAUGH  Report Released Date/Time: Oct 26, 2022 03:13 PM  Reporting Lab: [facility] [address]  Performing Lab: [facility] [address] [facility]   COMPREHENSIVE METABOLIC PANEL+MG ALKALINE PHOSPHATASE [ENZYMATIC ACTIVITY/VOLUME] IN SERUM OR PLASMA 62  40 - 150 12/06/2022     Specimen Type: PLASMA  No comment entered.  Ordering Provider: BRENDA BAUGH  Report Released Date/Time: Oct 26, 2022 03:13 PM  Reporting Lab: [facility] [address]  Performing Lab: [facility] [address] [facility]   COMPREHENSIVE METABOLIC PANEL+MG ALANINE AMINOTRANSFERASE [ENZYMATIC ACTIVITY/VOLUME] IN SERUM OR PLASMA 29  &lt;55 - 55 12/06/2022     Specimen Type: PLASMA  No comment entered.  Ordering Provider: BRENDA BAUGH  Report Released Date/Time: Oct 26, 2022 03:13 PM  Reporting Lab: [facility] [address]  Performing Lab: [facility] [address] [facility]   COMPREHENSIVE METABOLIC PANEL+MG ASPARTATE AMINOTRANSFERASE [ENZYMATIC ACTIVITY/VOLUME] IN SERUM OR PLASMA 23  &lt;34 - 34 12/06/2022     Specimen Type: PLASMA  No comment entered.  Ordering Provider: BRENDA BAUGH  Report Released Date/Time: Oct 26, 2022 03:13 PM  Reporting Lab: [facility]  [facility] [address]  Performing Lab: [facility] [address] [facility]   COMPREHENSIVE METABOLIC PANEL+MG GLOMERULAR FILTRATION RATE/1.73 SQ M.PREDICTED [VOLUME RATE/AREA] IN SERUM, PLASMA OR BLOOD BY CREATININE-BASED FORMULA (CKD-EPI) 89  60 12/06/2022     Specimen Type: PLASMA  No comment entered.  Ordering Provider: BRENDA BAUGH  Report Released Date/Time: Oct 26, 2022 03:13 PM  Reporting Lab: [facility] [address]  Performing Lab: [facility] [address] [facility]         EKG/ stress test - if available please see in ROS above   No results found.  No flowsheet data found.      The patient's records and results personally reviewed by this provider.     [facility]      Assessment      Jaime Anderson is a 52 year old male seen as a PAC referral for risk assessment and optimization for anesthesia.    Plan/Recommendations  Pt will be optimized for the proposed procedure.  See below for details on the assessment, risk, and preoperative recommendations    NEUROLOGY  - No history of TIA, CVA or seizure  -Post Op delirium risk factors:  No risk identified    ENT  - No current airway concerns.  Will need to be reassessed day of surgery.  Mallampati: Unable to assess  TM: Unable to assess   ~ SNHL/ tinnitus - the patient has hearing aids.     CARDIAC  - Hypertension  Well controlled  - Hyperlipidemia  Well controlled on home regimen  - SVT - the patient reports he's had a history of 3 times of SVT. He was started on metoprolol for control. [facility]. Will get records of the EKG.   - METS (Metabolic Equivalents)  Patient performs 4 or more METS exercise without symptoms            Total Score: 0      RCRI-Very low risk: Class 1 0.4% complication rate            Total Score: 0    ~ The patient reports that he is  active but currently limited due to a tendon rupture in his left ankle. He denies any cardiac symptoms.     PULMONARY  - Obstructive Sleep Apnea  ANNE with home CPAP.  Patient will be instructed to bring their home CPAP device to the hospital with them.  - Denies asthma or inhaler use  - Tobacco History      History   Smoking Status     Never   Smokeless Tobacco     Never       GI  PONV Medium Risk  Total Score: 2           1 AN PONV: Patient is not a current smoker    1 AN PONV: Intended Post Op Opioids        /RENAL  - Baseline Creatinine  1.0  ~ Erectile dysfunction - hold alprostadil for 24 hours. Procedure as above. The patient has scheduled UA per Dr. Pugh.     ENDOCRINE    - BMI: Estimated body mass index is 33.91 kg/m  as calculated from the following:    Height as of 7/11/11: 1.829 m (6').    Weight as of 7/11/11: 113.4 kg (250 lb).  Obesity (BMI >30)  - Diabetes  Diabetes Mellitus, Type 2, non-insulin dependent.  Hold morning oral hypoglycemic medications. Recommend close monitoring of the patient's blood glucose levels throughout the perioperative period. [facility]9 in [facility]/22. He reports he was placed back on glipizide after his most recent A1c as on glipizide he was down in the 5 range which is why they stopped it.     HEME  VTE Low Risk 0.5%            Total Score: 2    VTE: Male      - No history of abnormal bleeding or antiplatelet use.    MSK  ~ Left foot tendon rupture - plan for surgery in March 2023    PSYCH  - PTSD - currently controlled.     Different anesthesia methods/types have been discussed with the patient, but they are aware that the final plan will be decided by the assigned anesthesia provider on the date of service.    The patient is optimized for their procedure. AVS with information on surgery time/arrival time, meds and NPO status given by nursing staff. No further diagnostic testing indicated.    Please refer to the physical examination  documented by the anesthesiologist in the anesthesia record on the day of surgery.    Video-Visit Details    Type of service:  Video Visit/ telephone - the video visit was started via [employer] but due to freezing issues the visit was converted to a telephone visit via [employer].     Provider received verbal consent for a Video Visit from the patient? Yes     Originating Location (pt. Location): Parked in their car    Distant Location (provider location):  Off-site  Mode of Communication:  Video Conference via [employer]  On the day of service:     Prep time: 15 minutes  Visit time: 15 minutes  Documentation time: 10 minutes  ------------------------------------------  Total time: 40 minutes      Christie Almazan PA-C  Preoperative Assessment Center  [facility]  Phone: [phone]  Fax: [phone]

## 2023-02-01 NOTE — TELEPHONE ENCOUNTER
Records Requested     January 31, 2023 8:11 PM  [id number]   Facility  [facility] Medical Records   Ph. [phone]  Fx. [phone]   Outcome Sent a fax for 12/2022 EKG

## 2023-02-03 ENCOUNTER — LAB (OUTPATIENT)
Dept: LAB | Facility: CLINIC | Age: 53
End: 2023-02-03
Payer: COMMERCIAL

## 2023-02-03 ENCOUNTER — TELEPHONE (OUTPATIENT)
Dept: UROLOGY | Facility: CLINIC | Age: 53
End: 2023-02-03

## 2023-02-03 DIAGNOSIS — N52.9 ERECTILE DYSFUNCTION, UNSPECIFIED ERECTILE DYSFUNCTION TYPE: ICD-10-CM

## 2023-02-03 LAB
ALBUMIN UR-MCNC: NEGATIVE MG/DL
APPEARANCE UR: CLEAR
BILIRUB UR QL STRIP: NEGATIVE
COLOR UR AUTO: ABNORMAL
GLUCOSE UR STRIP-MCNC: >1000 MG/DL
HGB UR QL STRIP: NEGATIVE
KETONES UR STRIP-MCNC: NEGATIVE MG/DL
LEUKOCYTE ESTERASE UR QL STRIP: NEGATIVE
NITRATE UR QL: NEGATIVE
PH UR STRIP: 5 [PH] (ref 5–7)
RBC #/AREA URNS AUTO: NORMAL /HPF
SP GR UR STRIP: 1.03 (ref 1–1.03)
UROBILINOGEN UR STRIP-MCNC: NORMAL MG/DL
WBC #/AREA URNS AUTO: NORMAL /HPF

## 2023-02-03 PROCEDURE — 81001 URINALYSIS AUTO W/SCOPE: CPT

## 2023-02-06 NOTE — TELEPHONE ENCOUNTER
"Called pt to let him know UA results and relay information from Dr. Pugh.      \"I just recommend he talk with primary doctor to get blood sugars under better control.   There is a higher risk for surgical infection of his penile implant if blood sugars are high.  If he were to get a post-op infection, the implant would need to be removed.     Thank You   Chase CARUSO\"     No answer.  Per chart, ok to leave VM on phone.  Detailed message left with instructions to call clinic back if he has any questions.    Karon Collins RN           "

## 2023-02-07 ENCOUNTER — ANESTHESIA (OUTPATIENT)
Dept: SURGERY | Facility: CLINIC | Age: 53
DRG: 709 | End: 2023-02-07
Payer: COMMERCIAL

## 2023-02-07 ENCOUNTER — HOSPITAL ENCOUNTER (INPATIENT)
Facility: CLINIC | Age: 53
LOS: 1 days | Discharge: HOME | DRG: 709 | End: 2023-02-08
Attending: UROLOGY | Admitting: UROLOGY
Payer: COMMERCIAL

## 2023-02-07 DIAGNOSIS — N52.9 ERECTILE DYSFUNCTION, UNSPECIFIED ERECTILE DYSFUNCTION TYPE: ICD-10-CM

## 2023-02-07 DIAGNOSIS — G89.18 POSTOPERATIVE PAIN: Primary | ICD-10-CM

## 2023-02-07 LAB
GLUCOSE BLDC GLUCOMTR-MCNC: 162 MG/DL (ref 70–99)
GLUCOSE BLDC GLUCOMTR-MCNC: 220 MG/DL (ref 70–99)
GLUCOSE BLDC GLUCOMTR-MCNC: 222 MG/DL (ref 70–99)

## 2023-02-07 PROCEDURE — 250N000011 HC RX IP 250 OP 636: Performed by: ANESTHESIOLOGY

## 2023-02-07 PROCEDURE — 999N000141 HC STATISTIC PRE-PROCEDURE NURSING ASSESSMENT: Performed by: UROLOGY

## 2023-02-07 PROCEDURE — 250N000011 HC RX IP 250 OP 636: Performed by: UROLOGY

## 2023-02-07 PROCEDURE — 250N000011 HC RX IP 250 OP 636: Performed by: NURSE ANESTHETIST, CERTIFIED REGISTERED

## 2023-02-07 PROCEDURE — 258N000003 HC RX IP 258 OP 636: Performed by: NURSE ANESTHETIST, CERTIFIED REGISTERED

## 2023-02-07 PROCEDURE — 250N000025 HC SEVOFLURANE, PER MIN: Performed by: UROLOGY

## 2023-02-07 PROCEDURE — 278N000051 HC OR IMPLANT GENERAL: Performed by: UROLOGY

## 2023-02-07 PROCEDURE — 250N000012 HC RX MED GY IP 250 OP 636 PS 637: Performed by: UROLOGY

## 2023-02-07 PROCEDURE — 120N000002 HC R&B MED SURG/OB UMMC

## 2023-02-07 PROCEDURE — 370N000017 HC ANESTHESIA TECHNICAL FEE, PER MIN: Performed by: UROLOGY

## 2023-02-07 PROCEDURE — 258N000003 HC RX IP 258 OP 636: Performed by: UROLOGY

## 2023-02-07 PROCEDURE — 360N000076 HC SURGERY LEVEL 3, PER MIN: Performed by: UROLOGY

## 2023-02-07 PROCEDURE — 54405 INSERT MULTI-COMP PENIS PROS: CPT | Mod: GC | Performed by: UROLOGY

## 2023-02-07 PROCEDURE — 0VUS0JZ SUPPLEMENT PENIS WITH SYNTHETIC SUBSTITUTE, OPEN APPROACH: ICD-10-PCS | Performed by: UROLOGY

## 2023-02-07 PROCEDURE — 250N000009 HC RX 250: Performed by: UROLOGY

## 2023-02-07 PROCEDURE — 250N000009 HC RX 250: Performed by: NURSE ANESTHETIST, CERTIFIED REGISTERED

## 2023-02-07 PROCEDURE — 250N000011 HC RX IP 250 OP 636

## 2023-02-07 PROCEDURE — 710N000010 HC RECOVERY PHASE 1, LEVEL 2, PER MIN: Performed by: UROLOGY

## 2023-02-07 PROCEDURE — 250N000013 HC RX MED GY IP 250 OP 250 PS 637: Performed by: UROLOGY

## 2023-02-07 PROCEDURE — C1813 PROSTHESIS, PENILE, INFLATAB: HCPCS | Performed by: UROLOGY

## 2023-02-07 PROCEDURE — 250N000009 HC RX 250

## 2023-02-07 PROCEDURE — 272N000001 HC OR GENERAL SUPPLY STERILE: Performed by: UROLOGY

## 2023-02-07 DEVICE — CL RESERVOIR
Type: IMPLANTABLE DEVICE | Site: ABDOMEN | Status: FUNCTIONAL
Brand: TITAN

## 2023-02-07 DEVICE — SCROTAL ZERO DEGREE ANGLE CYLINDER SET WITH PUMP
Type: IMPLANTABLE DEVICE | Site: PENIS | Status: FUNCTIONAL
Brand: TITAN

## 2023-02-07 DEVICE — ASSEMBLY KIT
Type: IMPLANTABLE DEVICE | Site: PENIS | Status: FUNCTIONAL
Brand: TITAN

## 2023-02-07 RX ORDER — SULFAMETHOXAZOLE/TRIMETHOPRIM 800-160 MG
1 TABLET ORAL 2 TIMES DAILY
Status: DISCONTINUED | OUTPATIENT
Start: 2023-02-07 | End: 2023-02-08 | Stop reason: HOSPADM

## 2023-02-07 RX ORDER — SODIUM CHLORIDE, SODIUM LACTATE, POTASSIUM CHLORIDE, CALCIUM CHLORIDE 600; 310; 30; 20 MG/100ML; MG/100ML; MG/100ML; MG/100ML
INJECTION, SOLUTION INTRAVENOUS CONTINUOUS
Status: DISCONTINUED | OUTPATIENT
Start: 2023-02-07 | End: 2023-02-07 | Stop reason: HOSPADM

## 2023-02-07 RX ORDER — CEFAZOLIN SODIUM/WATER 2 G/20 ML
2 SYRINGE (ML) INTRAVENOUS SEE ADMIN INSTRUCTIONS
Status: DISCONTINUED | OUTPATIENT
Start: 2023-02-07 | End: 2023-02-07 | Stop reason: HOSPADM

## 2023-02-07 RX ORDER — BACITRACIN ZINC 500 [USP'U]/G
OINTMENT TOPICAL PRN
Status: DISCONTINUED | OUTPATIENT
Start: 2023-02-07 | End: 2023-02-07 | Stop reason: HOSPADM

## 2023-02-07 RX ORDER — OXYCODONE HYDROCHLORIDE 5 MG/1
5 TABLET ORAL EVERY 4 HOURS PRN
Status: DISCONTINUED | OUTPATIENT
Start: 2023-02-07 | End: 2023-02-07 | Stop reason: HOSPADM

## 2023-02-07 RX ORDER — SODIUM CHLORIDE, SODIUM LACTATE, POTASSIUM CHLORIDE, CALCIUM CHLORIDE 600; 310; 30; 20 MG/100ML; MG/100ML; MG/100ML; MG/100ML
INJECTION, SOLUTION INTRAVENOUS CONTINUOUS PRN
Status: DISCONTINUED | OUTPATIENT
Start: 2023-02-07 | End: 2023-02-07

## 2023-02-07 RX ORDER — NICOTINE POLACRILEX 4 MG
15-30 LOZENGE BUCCAL
Status: DISCONTINUED | OUTPATIENT
Start: 2023-02-07 | End: 2023-02-08 | Stop reason: HOSPADM

## 2023-02-07 RX ORDER — LIDOCAINE 4 G/G
1 PATCH TOPICAL
Status: DISCONTINUED | OUTPATIENT
Start: 2023-02-08 | End: 2023-02-08 | Stop reason: HOSPADM

## 2023-02-07 RX ORDER — AMOXICILLIN 250 MG
1 CAPSULE ORAL 2 TIMES DAILY PRN
Qty: 30 TABLET | Refills: 0 | Status: SHIPPED | OUTPATIENT
Start: 2023-02-07

## 2023-02-07 RX ORDER — OXYCODONE HYDROCHLORIDE 5 MG/1
5 TABLET ORAL EVERY 4 HOURS PRN
Status: DISCONTINUED | OUTPATIENT
Start: 2023-02-07 | End: 2023-02-08 | Stop reason: HOSPADM

## 2023-02-07 RX ORDER — SODIUM CHLORIDE, SODIUM LACTATE, POTASSIUM CHLORIDE, CALCIUM CHLORIDE 600; 310; 30; 20 MG/100ML; MG/100ML; MG/100ML; MG/100ML
INJECTION, SOLUTION INTRAVENOUS CONTINUOUS
Status: DISPENSED | OUTPATIENT
Start: 2023-02-07 | End: 2023-02-07

## 2023-02-07 RX ORDER — FENTANYL CITRATE 50 UG/ML
25 INJECTION, SOLUTION INTRAMUSCULAR; INTRAVENOUS EVERY 5 MIN PRN
Status: DISCONTINUED | OUTPATIENT
Start: 2023-02-07 | End: 2023-02-07 | Stop reason: HOSPADM

## 2023-02-07 RX ORDER — GLYCINE 1.5 G/100ML
SOLUTION IRRIGATION PRN
Status: DISCONTINUED | OUTPATIENT
Start: 2023-02-07 | End: 2023-02-07 | Stop reason: HOSPADM

## 2023-02-07 RX ORDER — HYDROMORPHONE HYDROCHLORIDE 1 MG/ML
0.4 INJECTION, SOLUTION INTRAMUSCULAR; INTRAVENOUS; SUBCUTANEOUS
Status: DISCONTINUED | OUTPATIENT
Start: 2023-02-07 | End: 2023-02-08 | Stop reason: HOSPADM

## 2023-02-07 RX ORDER — LIDOCAINE 40 MG/G
CREAM TOPICAL
Status: DISCONTINUED | OUTPATIENT
Start: 2023-02-07 | End: 2023-02-08 | Stop reason: HOSPADM

## 2023-02-07 RX ORDER — LIDOCAINE 50 MG/G
OINTMENT TOPICAL 4 TIMES DAILY PRN
Status: DISCONTINUED | OUTPATIENT
Start: 2023-02-07 | End: 2023-02-08 | Stop reason: HOSPADM

## 2023-02-07 RX ORDER — BUPIVACAINE HYDROCHLORIDE 5 MG/ML
INJECTION, SOLUTION PERINEURAL PRN
Status: DISCONTINUED | OUTPATIENT
Start: 2023-02-07 | End: 2023-02-07 | Stop reason: HOSPADM

## 2023-02-07 RX ORDER — DEXMEDETOMIDINE HYDROCHLORIDE 4 UG/ML
INJECTION, SOLUTION INTRAVENOUS PRN
Status: DISCONTINUED | OUTPATIENT
Start: 2023-02-07 | End: 2023-02-07

## 2023-02-07 RX ORDER — SULFAMETHOXAZOLE/TRIMETHOPRIM 800-160 MG
1 TABLET ORAL 2 TIMES DAILY
Qty: 14 TABLET | Refills: 0 | Status: SHIPPED | OUTPATIENT
Start: 2023-02-07

## 2023-02-07 RX ORDER — CEFAZOLIN SODIUM/WATER 2 G/20 ML
2 SYRINGE (ML) INTRAVENOUS
Status: COMPLETED | OUTPATIENT
Start: 2023-02-07 | End: 2023-02-07

## 2023-02-07 RX ORDER — ONDANSETRON 4 MG/1
4 TABLET, ORALLY DISINTEGRATING ORAL EVERY 30 MIN PRN
Status: DISCONTINUED | OUTPATIENT
Start: 2023-02-07 | End: 2023-02-07 | Stop reason: HOSPADM

## 2023-02-07 RX ORDER — OXYCODONE HYDROCHLORIDE 10 MG/1
10 TABLET ORAL EVERY 4 HOURS PRN
Status: DISCONTINUED | OUTPATIENT
Start: 2023-02-07 | End: 2023-02-07 | Stop reason: HOSPADM

## 2023-02-07 RX ORDER — ONDANSETRON 4 MG/1
4 TABLET, ORALLY DISINTEGRATING ORAL EVERY 6 HOURS PRN
Status: DISCONTINUED | OUTPATIENT
Start: 2023-02-07 | End: 2023-02-08 | Stop reason: HOSPADM

## 2023-02-07 RX ORDER — GLIPIZIDE 5 MG/1
5 TABLET ORAL DAILY
Status: DISCONTINUED | OUTPATIENT
Start: 2023-02-08 | End: 2023-02-08 | Stop reason: HOSPADM

## 2023-02-07 RX ORDER — OXYCODONE HYDROCHLORIDE 5 MG/1
5-10 TABLET ORAL EVERY 6 HOURS PRN
Qty: 15 TABLET | Refills: 0 | Status: SHIPPED | OUTPATIENT
Start: 2023-02-07 | End: 2023-02-10

## 2023-02-07 RX ORDER — FAMOTIDINE 20 MG/1
20 TABLET, FILM COATED ORAL 2 TIMES DAILY
Status: DISCONTINUED | OUTPATIENT
Start: 2023-02-07 | End: 2023-02-08 | Stop reason: HOSPADM

## 2023-02-07 RX ORDER — LISINOPRIL 20 MG/1
20 TABLET ORAL EVERY MORNING
Status: DISCONTINUED | OUTPATIENT
Start: 2023-02-08 | End: 2023-02-08 | Stop reason: HOSPADM

## 2023-02-07 RX ORDER — ONDANSETRON 2 MG/ML
4 INJECTION INTRAMUSCULAR; INTRAVENOUS EVERY 6 HOURS PRN
Status: DISCONTINUED | OUTPATIENT
Start: 2023-02-07 | End: 2023-02-08 | Stop reason: HOSPADM

## 2023-02-07 RX ORDER — HYDROMORPHONE HYDROCHLORIDE 1 MG/ML
0.2 INJECTION, SOLUTION INTRAMUSCULAR; INTRAVENOUS; SUBCUTANEOUS
Status: DISCONTINUED | OUTPATIENT
Start: 2023-02-07 | End: 2023-02-08 | Stop reason: HOSPADM

## 2023-02-07 RX ORDER — HYDROMORPHONE HYDROCHLORIDE 1 MG/ML
0.2 INJECTION, SOLUTION INTRAMUSCULAR; INTRAVENOUS; SUBCUTANEOUS EVERY 5 MIN PRN
Status: DISCONTINUED | OUTPATIENT
Start: 2023-02-07 | End: 2023-02-07 | Stop reason: HOSPADM

## 2023-02-07 RX ORDER — CALCIUM CARBONATE 500 MG/1
500 TABLET, CHEWABLE ORAL 4 TIMES DAILY PRN
Status: DISCONTINUED | OUTPATIENT
Start: 2023-02-07 | End: 2023-02-08 | Stop reason: HOSPADM

## 2023-02-07 RX ORDER — PROCHLORPERAZINE MALEATE 10 MG
10 TABLET ORAL EVERY 6 HOURS PRN
Status: DISCONTINUED | OUTPATIENT
Start: 2023-02-07 | End: 2023-02-08 | Stop reason: HOSPADM

## 2023-02-07 RX ORDER — OXYCODONE HYDROCHLORIDE 10 MG/1
10 TABLET ORAL EVERY 4 HOURS PRN
Status: DISCONTINUED | OUTPATIENT
Start: 2023-02-07 | End: 2023-02-08 | Stop reason: HOSPADM

## 2023-02-07 RX ORDER — TESTOSTERONE CYPIONATE 200 MG/ML
50 INJECTION, SOLUTION INTRAMUSCULAR
COMMUNITY

## 2023-02-07 RX ORDER — ATORVASTATIN CALCIUM 20 MG/1
20 TABLET, FILM COATED ORAL EVERY MORNING
Status: DISCONTINUED | OUTPATIENT
Start: 2023-02-08 | End: 2023-02-08 | Stop reason: HOSPADM

## 2023-02-07 RX ORDER — FENTANYL CITRATE 50 UG/ML
50 INJECTION, SOLUTION INTRAMUSCULAR; INTRAVENOUS EVERY 5 MIN PRN
Status: DISCONTINUED | OUTPATIENT
Start: 2023-02-07 | End: 2023-02-07 | Stop reason: HOSPADM

## 2023-02-07 RX ORDER — NALOXONE HYDROCHLORIDE 0.4 MG/ML
0.4 INJECTION, SOLUTION INTRAMUSCULAR; INTRAVENOUS; SUBCUTANEOUS
Status: DISCONTINUED | OUTPATIENT
Start: 2023-02-07 | End: 2023-02-08 | Stop reason: HOSPADM

## 2023-02-07 RX ORDER — DEXTROSE MONOHYDRATE 25 G/50ML
25-50 INJECTION, SOLUTION INTRAVENOUS
Status: DISCONTINUED | OUTPATIENT
Start: 2023-02-07 | End: 2023-02-08 | Stop reason: HOSPADM

## 2023-02-07 RX ORDER — FENTANYL CITRATE-0.9 % NACL/PF 10 MCG/ML
PLASTIC BAG, INJECTION (ML) INTRAVENOUS PRN
Status: DISCONTINUED | OUTPATIENT
Start: 2023-02-07 | End: 2023-02-07

## 2023-02-07 RX ORDER — HYDROMORPHONE HYDROCHLORIDE 1 MG/ML
0.4 INJECTION, SOLUTION INTRAMUSCULAR; INTRAVENOUS; SUBCUTANEOUS EVERY 5 MIN PRN
Status: DISCONTINUED | OUTPATIENT
Start: 2023-02-07 | End: 2023-02-07 | Stop reason: HOSPADM

## 2023-02-07 RX ORDER — FENTANYL CITRATE 50 UG/ML
INJECTION, SOLUTION INTRAMUSCULAR; INTRAVENOUS PRN
Status: DISCONTINUED | OUTPATIENT
Start: 2023-02-07 | End: 2023-02-07

## 2023-02-07 RX ORDER — ONDANSETRON 2 MG/ML
INJECTION INTRAMUSCULAR; INTRAVENOUS PRN
Status: DISCONTINUED | OUTPATIENT
Start: 2023-02-07 | End: 2023-02-07

## 2023-02-07 RX ORDER — ONDANSETRON 2 MG/ML
4 INJECTION INTRAMUSCULAR; INTRAVENOUS EVERY 30 MIN PRN
Status: DISCONTINUED | OUTPATIENT
Start: 2023-02-07 | End: 2023-02-07 | Stop reason: HOSPADM

## 2023-02-07 RX ORDER — PROPOFOL 10 MG/ML
INJECTION, EMULSION INTRAVENOUS PRN
Status: DISCONTINUED | OUTPATIENT
Start: 2023-02-07 | End: 2023-02-07

## 2023-02-07 RX ORDER — DEXAMETHASONE SODIUM PHOSPHATE 4 MG/ML
INJECTION, SOLUTION INTRA-ARTICULAR; INTRALESIONAL; INTRAMUSCULAR; INTRAVENOUS; SOFT TISSUE PRN
Status: DISCONTINUED | OUTPATIENT
Start: 2023-02-07 | End: 2023-02-07

## 2023-02-07 RX ORDER — NALOXONE HYDROCHLORIDE 0.4 MG/ML
0.2 INJECTION, SOLUTION INTRAMUSCULAR; INTRAVENOUS; SUBCUTANEOUS
Status: DISCONTINUED | OUTPATIENT
Start: 2023-02-07 | End: 2023-02-08 | Stop reason: HOSPADM

## 2023-02-07 RX ORDER — LIDOCAINE HYDROCHLORIDE 20 MG/ML
INJECTION, SOLUTION INFILTRATION; PERINEURAL PRN
Status: DISCONTINUED | OUTPATIENT
Start: 2023-02-07 | End: 2023-02-07

## 2023-02-07 RX ORDER — ACETAMINOPHEN 325 MG/1
650 TABLET ORAL EVERY 6 HOURS PRN
Status: DISCONTINUED | OUTPATIENT
Start: 2023-02-07 | End: 2023-02-08 | Stop reason: HOSPADM

## 2023-02-07 RX ADMIN — Medication 2 G: at 13:10

## 2023-02-07 RX ADMIN — Medication 8 MCG: at 15:09

## 2023-02-07 RX ADMIN — FAMOTIDINE 20 MG: 20 TABLET, FILM COATED ORAL at 19:34

## 2023-02-07 RX ADMIN — METFORMIN HYDROCHLORIDE 1000 MG: 500 TABLET, FILM COATED ORAL at 18:41

## 2023-02-07 RX ADMIN — MIDAZOLAM 2 MG: 1 INJECTION INTRAMUSCULAR; INTRAVENOUS at 13:06

## 2023-02-07 RX ADMIN — OXYCODONE HYDROCHLORIDE 5 MG: 5 TABLET ORAL at 19:34

## 2023-02-07 RX ADMIN — FENTANYL CITRATE 50 MCG: 50 INJECTION INTRAMUSCULAR; INTRAVENOUS at 15:41

## 2023-02-07 RX ADMIN — SODIUM CHLORIDE, POTASSIUM CHLORIDE, SODIUM LACTATE AND CALCIUM CHLORIDE: 600; 310; 30; 20 INJECTION, SOLUTION INTRAVENOUS at 15:11

## 2023-02-07 RX ADMIN — INSULIN ASPART 2 UNITS: 100 INJECTION, SOLUTION INTRAVENOUS; SUBCUTANEOUS at 18:39

## 2023-02-07 RX ADMIN — ONDANSETRON 4 MG: 2 INJECTION INTRAMUSCULAR; INTRAVENOUS at 14:18

## 2023-02-07 RX ADMIN — GENTAMICIN SULFATE 440 MG: 40 INJECTION, SOLUTION INTRAMUSCULAR; INTRAVENOUS at 12:55

## 2023-02-07 RX ADMIN — OXYCODONE HYDROCHLORIDE 5 MG: 5 TABLET ORAL at 23:38

## 2023-02-07 RX ADMIN — HYDROMORPHONE HYDROCHLORIDE 0.2 MG: 1 INJECTION, SOLUTION INTRAMUSCULAR; INTRAVENOUS; SUBCUTANEOUS at 15:53

## 2023-02-07 RX ADMIN — HYDROMORPHONE HYDROCHLORIDE 0.5 MG: 1 INJECTION, SOLUTION INTRAMUSCULAR; INTRAVENOUS; SUBCUTANEOUS at 14:18

## 2023-02-07 RX ADMIN — PHENYLEPHRINE HYDROCHLORIDE 0.4 MCG/KG/MIN: 10 INJECTION INTRAVENOUS at 13:17

## 2023-02-07 RX ADMIN — FENTANYL CITRATE 25 MCG: 50 INJECTION, SOLUTION INTRAMUSCULAR; INTRAVENOUS at 13:51

## 2023-02-07 RX ADMIN — FENTANYL CITRATE 50 MCG: 50 INJECTION INTRAMUSCULAR; INTRAVENOUS at 15:34

## 2023-02-07 RX ADMIN — HYDROMORPHONE HYDROCHLORIDE 0.5 MG: 1 INJECTION, SOLUTION INTRAMUSCULAR; INTRAVENOUS; SUBCUTANEOUS at 15:11

## 2023-02-07 RX ADMIN — ACETAMINOPHEN 650 MG: 325 TABLET ORAL at 21:26

## 2023-02-07 RX ADMIN — FENTANYL CITRATE 25 MCG: 50 INJECTION, SOLUTION INTRAMUSCULAR; INTRAVENOUS at 13:27

## 2023-02-07 RX ADMIN — DEXAMETHASONE SODIUM PHOSPHATE 4 MG: 4 INJECTION, SOLUTION INTRA-ARTICULAR; INTRALESIONAL; INTRAMUSCULAR; INTRAVENOUS; SOFT TISSUE at 13:11

## 2023-02-07 RX ADMIN — FENTANYL CITRATE 50 MCG: 50 INJECTION, SOLUTION INTRAMUSCULAR; INTRAVENOUS at 13:07

## 2023-02-07 RX ADMIN — SULFAMETHOXAZOLE AND TRIMETHOPRIM 1 TABLET: 800; 160 TABLET ORAL at 19:34

## 2023-02-07 RX ADMIN — PROPOFOL 200 MG: 10 INJECTION, EMULSION INTRAVENOUS at 13:07

## 2023-02-07 RX ADMIN — Medication 12.5 MG: at 19:34

## 2023-02-07 RX ADMIN — SODIUM CHLORIDE, POTASSIUM CHLORIDE, SODIUM LACTATE AND CALCIUM CHLORIDE: 600; 310; 30; 20 INJECTION, SOLUTION INTRAVENOUS at 13:01

## 2023-02-07 RX ADMIN — HYDROMORPHONE HYDROCHLORIDE 0.2 MG: 1 INJECTION, SOLUTION INTRAMUSCULAR; INTRAVENOUS; SUBCUTANEOUS at 16:26

## 2023-02-07 RX ADMIN — LIDOCAINE HYDROCHLORIDE 100 MG: 20 INJECTION, SOLUTION INFILTRATION; PERINEURAL at 13:07

## 2023-02-07 RX ADMIN — Medication 100 MCG: at 13:19

## 2023-02-07 RX ADMIN — SODIUM CHLORIDE, POTASSIUM CHLORIDE, SODIUM LACTATE AND CALCIUM CHLORIDE: 600; 310; 30; 20 INJECTION, SOLUTION INTRAVENOUS at 17:32

## 2023-02-07 ASSESSMENT — ACTIVITIES OF DAILY LIVING (ADL)
ADLS_ACUITY_SCORE: 22

## 2023-02-07 ASSESSMENT — ENCOUNTER SYMPTOMS: SEIZURES: 0

## 2023-02-07 ASSESSMENT — LIFESTYLE VARIABLES: TOBACCO_USE: 0

## 2023-02-07 NOTE — BRIEF OP NOTE
[facility]    Brief Operative Note    Pre-operative diagnosis: Erectile dysfunction, unspecified erectile dysfunction type [N52.9]  Post-operative diagnosis Same as pre-operative diagnosis    Procedure: Procedure(s):  INSERTION of COLOPLAST INFLATABLE PENILE PROSTHESIS  Surgeon: Surgeon(s) and Role:     * Nathanael Pugh MD - Primary     * Zackery Veliz MD - Resident - Assisting  Anesthesia: General   Estimated Blood Loss: Less than 50 ml    Drains: Alexander-Fatima and Hurley  Specimens: * No specimens in log *  Findings:   None.  Complications: None.  Implants:   Implant Name Type Inv. Item Serial No.  Lot No. LRB No. Used Action   IMP PROSTHESIS PENILE TITAN STD ASSEMBLY KIT [id number] - [id number] Other IMP PROSTHESIS PENILE TITAN STD ASSEMBLY KIT [id number]  COLOPLAST [id number] N/A 1 Implanted   IMP PROSTHESIS PENILE TITAN BIOFLEX SILICONE HYDROPHL [id number] - [id number] Penile Implant IMP PROSTHESIS PENILE TITAN BIOFLEX SILICONE HYDROPHL [id number]  COLOPLAST [id number] N/A 1 Implanted   RESERVOIR TITAN CL [id number] - [id number] Pump RESERVOIR TITAN CL [id number]  COLOPLAST [id number] Right 1 Implanted         Admit to Urology  Bactrim x7 days  TOV and drain out if <30cc/shift, then discharge    Zackery Veliz MD  Urology Resident

## 2023-02-07 NOTE — ANESTHESIA PREPROCEDURE EVALUATION
Pre-Operative H & P     CC:  Preoperative exam to assess for increased cardiopulmonary risk while undergoing surgery and anesthesia.    Date of Encounter: 1/31/2023  Primary Care Physician:  No Ref-Primary, Physician     Reason for visit:   No diagnosis found.    RO Anderson is a 52 year old male who presents for pre-operative H & P in preparation for  Procedure Information     Case: [id number] Anesthesia Start Date/Time: 02/07/23 1301    Procedure: INSERTION of COLOPLAST INFLATABLE PENILE PROSTHESIS (Penis)    Anesthesia type: General    Diagnosis: Erectile dysfunction, unspecified erectile dysfunction type [N52.9]    Pre-op diagnosis: Erectile dysfunction, unspecified erectile dysfunction type [N52.9]    Location:  [facility] / [facility]    Providers: Nathanael Pugh MD          Patient is a 82-year-old man with a past medical history significant for hypertension, hyper anemia, obstructive sleep apnea, type 2 diabetes, obesity, hearing loss, tinnitus, PTSD and erectile dysfunction.  [facility] by Dr. Pugh in the office on 10/26/2022.  During that visit they discussed treatment options and the patient is scheduled for the procedure as above.    History is obtained from the patient and chart review    Hx of abnormal bleeding or anti-platelet use: none      Past Medical History  Past Medical History:   Diagnosis Date     Erectile dysfunction, unspecified erectile dysfunction type      HLD (hyperlipidemia)      HTN (hypertension)      Hypogonadism male      Obesity      ANNE (obstructive sleep apnea)      PTSD (post-traumatic stress disorder)      SVT (supraventricular tachycardia) (H)      Type 2 diabetes mellitus (H)        Past Surgical History  Past Surgical History:   Procedure Laterality Date     APPENDECTOMY       OPEN REDUCTION INTERNAL FIXATION ANKLE  11/29/2010    OPEN REDUCTION INTERNAL FIXATION ANKLE performed by PAMELA BENITEZ at  OR       Prior to Admission  Medications  Current Outpatient Medications   Medication Sig Dispense Refill     oxyCODONE (ROXICODONE) 5 MG tablet Take 1-2 tablets (5-10 mg) by mouth every 6 hours as needed for pain 15 tablet 0       Allergies  Allergies   Allergen Reactions     Tuberculin Purified Protein Derivative Swelling       Social History  Social History     Socioeconomic History     Marital status: Single     Spouse name: Not on file     Number of children: Not on file     Years of education: Not on file     Highest education level: Not on file   Occupational History     Not on file   Tobacco Use     Smoking status: Never     Smokeless tobacco: Never   Substance and Sexual Activity     Alcohol use: Yes     Drug use: Never     Sexual activity: Not on file   Other Topics Concern     Parent/sibling w/ CABG, MI or angioplasty before 65F 55M? Not Asked   Social History Narrative     Not on file     Social Determinants of Health     Financial Resource Strain: Not on file   Food Insecurity: Not on file   Transportation Needs: Not on file   Physical Activity: Not on file   Stress: Not on file   Social Connections: Not on file   Intimate Partner Violence: Not on file   Housing Stability: Not on file       Family History  Family History   Problem Relation Age of Onset     Anesthesia Reaction No family hx of      Venous thrombosis No family hx of        Review of Systems  The complete review of systems is negative other than noted in the HPI or here.   Anesthesia Evaluation   Pt has had prior anesthetic. Type: General.    No history of anesthetic complications       ROS/MED HX  ENT/Pulmonary: Comment: SNHL  Tinnitus     (+) sleep apnea, uses CPAP,  (-) tobacco use   Neurologic:  - neg neurologic ROS  (-) no seizures, no CVA and no TIA   Cardiovascular:     (+) Dyslipidemia hypertension-----    METS/Exercise Tolerance: 4 - Raking leaves, gardening    Hematologic:  - neg hematologic  ROS     Musculoskeletal: Comment: Left foot tendon rupture       GI/Hepatic:  - neg GI/hepatic ROS  (-) GERD   Renal/Genitourinary: Comment: ED      Endo:     (+) type II DM, Last HgA1c: 7.9, date: 12/6/22, Not using insulin, - not using insulin pump. Obesity,     Psychiatric/Substance Use:     (+) psychiatric history other (comment) (PTSD)     Infectious Disease:  - neg infectious disease ROS     Malignancy:  - neg malignancy ROS     Other:  - neg other ROS          Virtual visit -  No vitals were obtained    Physical Exam  Constitutional: Awake, alert, cooperative, no apparent distress, and appears stated age.  Eyes: Pupils equal  HENT: Normocephalic  Respiratory: non labored breathing   Neurologic: Awake, alert, oriented to name, place and time.   Neuropsychiatric: Calm, cooperative. Normal affect.      Prior Labs/Diagnostic Studies   All labs and imaging personally reviewed   HEMOGLOBIN A1C HEMOGLOBIN A1C/HEMOGLOBIN.TOTAL IN BLOOD 7.9  4.0 - 6.0 12/06/2022 H     CBC & DIFF LEUKOCYTES [#/VOLUME] IN BLOOD BY AUTOMATED COUNT 10.66  4.0 - 11.0 12/06/2022     Specimen Type: BLOOD  Comment: Automated Differential Performed  Ordering Provider: BRENDA BAUGH  Report Released Date/Time: Oct 26, 2022 03:13 PM  Reporting Lab: [facility] [address]  Performing Lab: [facility] [address] [facility]   CBC & DIFF ERYTHROCYTES [#/VOLUME] IN BLOOD BY AUTOMATED COUNT 5.13  4.6 - 6.2 12/06/2022     Specimen Type: BLOOD  Comment: Automated Differential Performed  Ordering Provider: BRENDA BAUGH  Report Released Date/Time: Oct 26, 2022 03:13 PM  Reporting Lab: [facility] [phone]  Performing Lab: [facility] [address] [facility]   CBC & DIFF HEMOGLOBIN [MASS/VOLUME] IN BLOOD 15.8  13.5 - 17.9 12/06/2022     Specimen Type: BLOOD  Comment: Automated Differential Performed  Ordering Provider: BRENDA BAUGH  Report  Released Date/Time: Oct 26, 2022 03:13 PM  Reporting Lab: [facility] [address]  Performing Lab: [facility] [address] [facility]   CBC & DIFF HEMATOCRIT [VOLUME FRACTION] OF BLOOD BY AUTOMATED COUNT 48.0  41 - 54 12/06/2022     Specimen Type: BLOOD  Comment: Automated Differential Performed  Ordering Provider: BRENDA BAUGH  Report Released Date/Time: Oct 26, 2022 03:13 PM  Reporting Lab: [facility] [address]  Performing Lab: [facility] [address] [facility]   CBC & DIFF MCV [ENTITIC VOLUME] BY AUTOMATED COUNT 93.6  80 - 100 12/06/2022     Specimen Type: BLOOD  Comment: Automated Differential Performed  Ordering Provider: BRENDA BAUGH  Report Released Date/Time: Oct 26, 2022 03:13 PM  Reporting Lab: [facility] [address]  Performing Lab: [facility] [address] [facility]   CBC & DIFF MCH [ENTITIC MASS] BY AUTOMATED COUNT 30.8  27 - 33 12/06/2022     Specimen Type: BLOOD  Comment: Automated Differential Performed  Ordering Provider: BRENDA BAUGH  Report Released Date/Time: Oct 26, 2022 03:13 PM  Reporting Lab: [facility] [address]  Performing Lab: [facility] [address] [facility]   CBC & DIFF MCHC [MASS/VOLUME] BY AUTOMATED COUNT 32.9  32.0 - 37.5 12/06/2022     Specimen Type: BLOOD  Comment: Automated Differential Performed  Ordering Provider: BRENDA BAUGH  Report Released Date/Time: Oct 26, 2022 03:13 PM  Reporting Lab: [facility] [address]  Performing Lab: [facility] [address] [facility]   CBC & DIFF PLATELETS [#/VOLUME] IN BLOOD BY AUTOMATED COUNT 294  150 -  400 12/06/2022     Specimen Type: BLOOD  Comment: Automated Differential Performed  Ordering Provider: BRENDA BAUGH  Report Released Date/Time: Oct 26, 2022 03:13 PM  Reporting Lab: [facility] [address]  Performing Lab: [facility] [address] [facility]   CBC & DIFF PLATELET MEAN VOLUME [ENTITIC VOLUME] IN BLOOD BY AUTOMATED COUNT 9.4  7.4 - 10.4 12/06/2022     Specimen Type: BLOOD  Comment: Automated Differential Performed  Ordering Provider: BRENDA BAUGH  Report Released Date/Time: Oct 26, 2022 03:13 PM  Reporting Lab: [facility] [address]  Performing Lab: [facility] [address] [facility]   CBC & DIFF NEUTROPHILS/100 LEUKOCYTES IN BLOOD BY MANUAL COUNT 56.0    12/06/2022     Specimen Type: BLOOD  Comment: Automated Differential Performed  Ordering Provider: BRENDA BAUGH  Report Released Date/Time: Oct 26, 2022 03:13 PM  Reporting Lab: [facility] [address]  Performing Lab: [facility] [address] [facility]   CBC & DIFF LYMPHOCYTES/100 LEUKOCYTES IN BLOOD BY MANUAL COUNT 35.0    12/06/2022     Specimen Type: BLOOD  Comment: Automated Differential Performed  Ordering Provider: BRENDA BAUGH  Report Released Date/Time: Oct 26, 2022 03:13 PM  Reporting Lab: [facility] [address]  Performing Lab: [facility] [address] [facility]   CBC & DIFF MONOCYTES/100 LEUKOCYTES IN BLOOD BY AUTOMATED COUNT 4.9    12/06/2022     Specimen Type: BLOOD  Comment: Automated Differential Performed  Ordering Provider: BRENDA BAUGH  Report Released Date/Time: Oct 26, 2022 03:13 PM  Reporting Lab: [facility] [address]  Performing Lab:  [facility] [address] [facility]   CBC & DIFF EOSINOPHILS/100 LEUKOCYTES IN BLOOD BY AUTOMATED COUNT 2.8    12/06/2022     Specimen Type: BLOOD  Comment: Automated Differential Performed  Ordering Provider: BRENDA BAUGH  Report Released Date/Time: Oct 26, 2022 03:13 PM  Reporting Lab: [facility] [phone]  Performing Lab: [facility] [address] [facility]   CBC & DIFF BASOPHILS/100 LEUKOCYTES IN BLOOD BY MANUAL COUNT 0.9    12/06/2022     Specimen Type: BLOOD  Comment: Automated Differential Performed  Ordering Provider: BRENDA BAUGH  Report Released Date/Time: Oct 26, 2022 03:13 PM  Reporting Lab: [facility] [address]  Performing Lab: [facility] [address] [facility]   CBC & DIFF ERYTHROCYTE DISTRIBUTION WIDTH [RATIO] BY AUTOMATED COUNT 12.4  11.5 - 14.5 12/06/2022     Specimen Type: BLOOD  Comment: Automated Differential Performed  Ordering Provider: BRENDA BAUGH  Report Released Date/Time: Oct 26, 2022 03:13 PM  Reporting Lab: [facility] [address]  Performing Lab: [facility] [address] [facility]   CBC & DIFF LYMPHOCYTES [#/VOLUME] IN BLOOD BY AUTOMATED COUNT 3.73  1.0 - 4.0 12/06/2022     Specimen Type: BLOOD  Comment: Automated Differential Performed  Ordering Provider: BRENDA BAUGH  Report Released Date/Time: Oct 26, 2022 03:13 PM  Reporting Lab: [facility] [address]  Performing Lab: [facility] [address] [facility]   CBC & DIFF MONOCYTES [#/VOLUME] IN BLOOD BY AUTOMATED COUNT 0.52  0.1 - 1.0 12/06/2022     Specimen Type: BLOOD  Comment: Automated Differential Performed  Ordering Provider:  BRENDA BAUGH  Report Released Date/Time: Oct 26, 2022 03:13 PM  Reporting Lab: [facility] [address]  Performing Lab: [facility] [address] [facility]   CBC & DIFF NEUTROPHILS [#/VOLUME] IN BLOOD BY AUTOMATED COUNT 5.97  2.0 - 7.7 12/06/2022     Specimen Type: BLOOD  Comment: Automated Differential Performed  Ordering Provider: BRENDA BAUGH  Report Released Date/Time: Oct 26, 2022 03:13 PM  Reporting Lab: [facility] [address]  Performing Lab: [facility] [address] [facility]   CBC & DIFF EOSINOPHILS [#/VOLUME] IN BLOOD BY AUTOMATED COUNT 0.30  0 - 0.5 12/06/2022     Specimen Type: BLOOD  Comment: Automated Differential Performed  Ordering Provider: BRENDA BAUGH  Report Released Date/Time: Oct 26, 2022 03:13 PM  Reporting Lab: [facility] [address]  Performing Lab: [facility] [address] [facility]   CBC & DIFF BASOPHILS [#/VOLUME] IN BLOOD BY AUTOMATED COUNT 0.10  0 - 0.2 12/06/2022     Specimen Type: BLOOD  Comment: Automated Differential Performed  Ordering Provider: BRENDA BAUGH  Report Released Date/Time: Oct 26, 2022 03:13 PM  Reporting Lab: [facility] [address]  Performing Lab: [facility] [address] [facility]   CBC & DIFF IG(META,MYELO,PRO) 0.4    12/06/2022     Specimen Type: BLOOD  Comment: Automated Differential Performed  Ordering Provider: BRENDA BAUGH  Report Released Date/Time: Oct 26, 2022 03:13 PM  Reporting Lab: [facility] [address]  Performing Lab: [facility] [address] [facility]   CBC & DIFF IMMATURE GRANULOCYTES [PRESENCE] IN BLOOD BY AUTOMATED  COUNT 0.04  0 - 0.1 12/06/2022     Specimen Type: BLOOD  Comment: Automated Differential Performed  Ordering Provider: BRENDA BAUGH  Report Released Date/Time: Oct 26, 2022 03:13 PM  Reporting Lab: [facility] [address]  Performing Lab: [facility] [address] [facility]   COMPREHENSIVE METABOLIC PANEL+MG CREATININE [MASS/VOLUME] IN SERUM OR PLASMA 1.0  0.7 - 1.2 12/06/2022     Specimen Type: PLASMA  No comment entered.  Ordering Provider: BRENDA BAUGH  Report Released Date/Time: Oct 26, 2022 03:13 PM  Reporting Lab: [facility] [address]  Performing Lab: [facility] [address] [facility]   COMPREHENSIVE METABOLIC PANEL+MG UREA NITROGEN [MASS/VOLUME] IN SERUM OR PLASMA 22  8 - 26 12/06/2022     Specimen Type: PLASMA  No comment entered.  Ordering Provider: BRENDA BAUGH  Report Released Date/Time: Oct 26, 2022 03:13 PM  Reporting Lab: [facility] [address]  Performing Lab: [facility] [address] [facility]   COMPREHENSIVE METABOLIC PANEL+MG GLUCOSE [MASS/VOLUME] IN SERUM OR PLASMA 178  70 - 100 12/06/2022 H   Specimen Type: PLASMA  No comment entered.  Ordering Provider: BRENDA BAUGH  Report Released Date/Time: Oct 26, 2022 03:13 PM  Reporting Lab: [facility] [address]  Performing Lab: [facility] [address] [facility]   COMPREHENSIVE METABOLIC PANEL+MG SODIUM [MOLES/VOLUME] IN SERUM OR PLASMA 141  136 - 145 12/06/2022     Specimen Type: PLASMA  No comment entered.  Ordering Provider: BRENDA BAUGH  Report Released Date/Time: Oct 26, 2022 03:13 PM  Reporting Lab: [facility] [address]  Performing Lab: [facility]  [facility] [address] [facility]   COMPREHENSIVE METABOLIC PANEL+MG POTASSIUM [MOLES/VOLUME] IN SERUM OR PLASMA 4.5  3.5 - 5.1 12/06/2022     Specimen Type: PLASMA  No comment entered.  Ordering Provider: BRENDA BAUGH  Report Released Date/Time: Oct 26, 2022 03:13 PM  Reporting Lab: [facility] [address]  Performing Lab: [facility] [address] [facility]   COMPREHENSIVE METABOLIC PANEL+MG CHLORIDE [MOLES/VOLUME] IN SERUM OR PLASMA 110  98 - 107 12/06/2022 H   Specimen Type: PLASMA  No comment entered.  Ordering Provider: BRENDA BAUGH  Report Released Date/Time: Oct 26, 2022 03:13 PM  Reporting Lab: [facility] [address]  Performing Lab: [facility] [address] [facility]   COMPREHENSIVE METABOLIC PANEL+MG CARBON DIOXIDE, TOTAL [MOLES/VOLUME] IN SERUM OR PLASMA 22  22 - 29 12/06/2022     Specimen Type: PLASMA  No comment entered.  Ordering Provider: BRENDA BAUGH  Report Released Date/Time: Oct 26, 2022 03:13 PM  Reporting Lab: [facility] [address]  Performing Lab: [facility] [address] [facility]   COMPREHENSIVE METABOLIC PANEL+MG CALCIUM [MASS/VOLUME] IN SERUM OR PLASMA 9.3  8.4 - 10.2 12/06/2022     Specimen Type: PLASMA  No comment entered.  Ordering Provider: BRENDA BAUGH  Report Released Date/Time: Oct 26, 2022 03:13 PM  Reporting Lab: [facility] [address]  Performing Lab: [facility] [address] [facility]   COMPREHENSIVE METABOLIC PANEL+MG PROTEIN [MASS/VOLUME] IN SERUM OR PLASMA 7.6  6.0 - 8.3 12/06/2022     Specimen Type: PLASMA  No comment entered.  Ordering Provider: BRENDA BAUGH  Report Released Date/Time: Oct 26, 2022 03:13  PM  Reporting Lab: [facility] [address]  Performing Lab: [facility] [address] [facility]   COMPREHENSIVE METABOLIC PANEL+MG ALBUMIN [MASS/VOLUME] IN SERUM OR PLASMA 4.2  3.5 - 5.2 12/06/2022     Specimen Type: PLASMA  No comment entered.  Ordering Provider: BRENDA BAUGH  Report Released Date/Time: Oct 26, 2022 03:13 PM  Reporting Lab: [facility] [address]  Performing Lab: [facility] [address] [facility]   COMPREHENSIVE METABOLIC PANEL+MG BILIRUBIN.TOTAL [MASS/VOLUME] IN SERUM OR PLASMA 0.3  0.2 - 1.2 12/06/2022     Specimen Type: PLASMA  No comment entered.  Ordering Provider: BRENDA BAUGH  Report Released Date/Time: Oct 26, 2022 03:13 PM  Reporting Lab: [facility] [address]  Performing Lab: [facility] [address] [facility]   COMPREHENSIVE METABOLIC PANEL+MG MAGNESIUM [MASS/VOLUME] IN SERUM OR PLASMA 2.0  1.6 - 2.6 12/06/2022     Specimen Type: PLASMA  No comment entered.  Ordering Provider: BRENDA BAUGH  Report Released Date/Time: Oct 26, 2022 03:13 PM  Reporting Lab: [facility] [address]  Performing Lab: [facility] [address] [facility]   COMPREHENSIVE METABOLIC PANEL+MG ANION GAP IN SERUM OR PLASMA 9  5 - 15 12/06/2022     Specimen Type: PLASMA  No comment entered.  Ordering Provider: BRENDA BAUGH  Report Released Date/Time: Oct 26, 2022 03:13 PM  Reporting Lab: [facility] [address]  Performing Lab: [facility] [address] [facility]   COMPREHENSIVE METABOLIC PANEL+MG ALKALINE PHOSPHATASE [ENZYMATIC ACTIVITY/VOLUME] IN SERUM OR PLASMA 62  40 - 150 12/06/2022      Specimen Type: PLASMA  No comment entered.  Ordering Provider: BRENDA BAUGH  Report Released Date/Time: Oct 26, 2022 03:13 PM  Reporting Lab: [facility] [address]  Performing Lab: [facility] [address] [facility]   COMPREHENSIVE METABOLIC PANEL+MG ALANINE AMINOTRANSFERASE [ENZYMATIC ACTIVITY/VOLUME] IN SERUM OR PLASMA 29  &lt;55 - 55 12/06/2022     Specimen Type: PLASMA  No comment entered.  Ordering Provider: BRENDA BAUGH  Report Released Date/Time: Oct 26, 2022 03:13 PM  Reporting Lab: [facility] [address]  Performing Lab: [facility] [address] [facility]   COMPREHENSIVE METABOLIC PANEL+MG ASPARTATE AMINOTRANSFERASE [ENZYMATIC ACTIVITY/VOLUME] IN SERUM OR PLASMA 23  &lt;34 - 34 12/06/2022     Specimen Type: PLASMA  No comment entered.  Ordering Provider: BRENDA BAUGH  Report Released Date/Time: Oct 26, 2022 03:13 PM  Reporting Lab: [facility] [address]  Performing Lab: [facility] [address] [facility]   COMPREHENSIVE METABOLIC PANEL+MG GLOMERULAR FILTRATION RATE/1.73 SQ M.PREDICTED [VOLUME RATE/AREA] IN SERUM, PLASMA OR BLOOD BY CREATININE-BASED FORMULA (CKD-EPI) 89  60 12/06/2022     Specimen Type: PLASMA  No comment entered.  Ordering Provider: BRENDA BAUGH  Report Released Date/Time: Oct 26, 2022 03:13 PM  Reporting Lab: [facility] [address]  Performing Lab: [facility] [address] [facility]         EKG/ stress test - if available please see in ROS above   No results found.  No flowsheet data found.      The patient's records and results personally reviewed by this provider.     [facility]      Assessment      Jaime Anderson  is a 52 year old male seen as a PAC referral for risk assessment and optimization for anesthesia.    Plan/Recommendations  Pt will be optimized for the proposed procedure.  See below for details on the assessment, risk, and preoperative recommendations    NEUROLOGY  - No history of TIA, CVA or seizure  -Post Op delirium risk factors:  No risk identified    ENT  - No current airway concerns.  Will need to be reassessed day of surgery.  Mallampati: Unable to assess  TM: Unable to assess   ~ SNHL/ tinnitus - the patient has hearing aids.     CARDIAC  - Hypertension  Well controlled  - Hyperlipidemia  Well controlled on home regimen  - SVT - the patient reports he's had a history of 3 times of SVT. He was started on metoprolol for control. [facility]. Will get records of the EKG.   - METS (Metabolic Equivalents)  Patient performs 4 or more METS exercise without symptoms            Total Score: 0      RCRI-Very low risk: Class 1 0.4% complication rate            Total Score: 0    ~ The patient reports that he is active but currently limited due to a tendon rupture in his left ankle. He denies any cardiac symptoms.     PULMONARY  - Obstructive Sleep Apnea  ANNE with home CPAP.  Patient will be instructed to bring their home CPAP device to the hospital with them.  - Denies asthma or inhaler use  - Tobacco History      History   Smoking Status     Never   Smokeless Tobacco     Never       GI  PONV Medium Risk  Total Score: 2           1 AN PONV: Patient is not a current smoker    1 AN PONV: Intended Post Op Opioids        /RENAL  - Baseline Creatinine  1.0  ~ Erectile dysfunction - hold alprostadil for 24 hours. Procedure as above. The patient has scheduled UA per Dr. Pugh.     ENDOCRINE    - BMI: Estimated body mass index is 31.28 kg/m  as calculated from the following:    Height as of an earlier encounter on 2/7/23: 1.829 m (6').    Weight as of an earlier encounter on 2/7/23: 104.6 kg (230  lb 9.6 oz).  Obesity (BMI >30)  - Diabetes  Diabetes Mellitus, Type 2, non-insulin dependent.  Hold morning oral hypoglycemic medications. Recommend close monitoring of the patient's blood glucose levels throughout the perioperative period. The [facility] 7[facility]9 in 12/6/22. He reports he was placed back on glipizide after his most recent A1c as on glipizide he was down in the 5 range which is why they stopped it.     HEME  VTE Low Risk 0.5%            Total Score: 2    VTE: Male      - No history of abnormal bleeding or antiplatelet use.    MSK  ~ Left foot tendon rupture - plan for surgery in March 2023    PSYCH  - PTSD - currently controlled.     Different anesthesia methods/types have been discussed with the patient, but they are aware that the final plan will be decided by the assigned anesthesia provider on the date of service.    The patient is optimized for their procedure. AVS with information on surgery time/arrival time, meds and NPO status given by nursing staff. No further diagnostic testing indicated.    Please refer to the physical examination documented by the anesthesiologist in the anesthesia record on the day of surgery.    Video-Visit Details    Type of service:  Video Visit/ telephone - the video visit was started via [employer] but due to freezing issues the visit was converted to a telephone visit via [place] [employer].     Provider received verbal consent for a Video Visit from the patient? Yes     Originating Location (pt. Location): Parked in their car    Distant Location (provider location):  Off-site  Mode of Communication:  Video Conference via [employer]/ [place] [employer]  On the day of service:     Prep time: 15 minutes  Visit time: 15 minutes  Documentation time: 10 minutes  ------------------------------------------  Total time: 40 minutes      Christie Almazan PA-C  Preoperative Assessment Center  [facility]  [facility]  Phone: [phone]  Fax:  [phone]    Physical Exam    Airway        Mallampati: I   TM distance: > 3 FB   Neck ROM: full   Mouth opening: > 3 cm    Respiratory Devices and Support         Dental       (+) Modest Abnormalities - crowns, retainers, 1 or 2 missing teeth      Cardiovascular          Rhythm and rate: regular and normal     Pulmonary           breath sounds clear to auscultation             Anesthesia Plan    ASA Status:  2   NPO Status:  NPO Appropriate    Anesthesia Type: General.     - Airway: LMA   Induction: Intravenous.   Maintenance: Balanced.        Consents    Anesthesia Plan(s) and associated risks, benefits, and realistic alternatives discussed. Questions answered and patient/representative(s) expressed understanding.     - Discussed: Risks, Benefits and Alternatives for BOTH SEDATION and the PROCEDURE were discussed     - Discussed with:  Patient      - Extended Intubation/Ventilatory Support Discussed: No.      - Patient is DNR/DNI Status: No    Use of blood products discussed: No .     Postoperative Care    Pain management: IV analgesics, Oral pain medications.   PONV prophylaxis: Ondansetron (or other 5HT-3), Dexamethasone or Solumedrol     Comments:

## 2023-02-07 NOTE — OP NOTE
OPERATIVE REPORT    PREOPERATIVE DIAGNOSIS: Erectile dysfunction    POSTOPERATIVE DIAGNOSIS: Same    PROCEDURES PERFORMED:   1. Implant inflatable penile prosthesis - Coloplast Titan 18cm zero degree with bilateral 3cm rear tip    STAFF SURGEON: Nathanael Pugh MD, present for entire case.     RESIDENT(S): Zackery Veliz MD    ANESTHESIA: General    ESTIMATED BLOOD LOSS: 30 mL.     IV FLUIDS: See anesthesia record    COMPLICATIONS: None.     SIGNIFICANT FINDINGS: Proper fitting of implants equally with distal tips in mid glans    BRIEF OPERATIVE INDICATIONS: Jaime Anderson is a 52 year old male with history of erectile dysfunction which failed other conservative measures. The patient was counseled on the alternatives, risks, and benefits and elected to proceed with the above stated procedure.      DESCRIPTION OF PROCEDURE: After full informed voluntary consent was obtained, the patient was transported to the operating room, placed supine on the table. A brief was held. Pneumo boots were applied, and a general anesthetic was induced without complication then they were prepped and draped in the usual sterile fashion. A timeout was taken to confirm correct patient, procedure and laterality. The genitals were shaved, prepped and draped in the supine position using an alcohol based prep. A Lone Star (SKW) retractor was placed at the start of the case followed by a 16Fr villa catheter without difficulty. 10cc of sterile water was placed in the balloon. A sharp retractor was placed in the distal dorsal urethra to retract the penis cephalad.     Next, we proceeded by making a penoscrotal incision transversely with a 15 blade scalpel, approximately 5 cm in length. We then bluntly dissected down to identify the corporal bodies. Two 2-0 PDS sutures were then placed vertically in each of the corporal bodies to aid in marking and holding the corpora, and a scalpel was used to incise the corpora. Metzenbaum scissors were then used  to first gently dilate proximally down to the base of the corporal bodies and then distally to the mid glans bilaterally. We then used the Rosales dilators starting at size 8 and increasing up to size 14 to dilate the tract proximally and distally to the glans with some resistance distally, and the distal tract was only dilated to 12.  Next, the Chaim device was used to identify and measure the corporal lengths; measurements were 12cm proximal and 9cm distal right while 9cm distal and 12cm proximal left. Next aseptic irrigation was used to irrigate each corporal cavity copiously.      The Coloplast Titan implant was then prepared on the backtable and soaked in the chlorhexidine solution.  The implant, a 18cm zero degree with a 3cm rear tip, was placed in each corpora. The tubing was positioned so as to avoid entanglement. It inflated easily with proper filling and bilateral implants in the mid glans; no evidence of crossover, with symmetric and cosmetically ideal appearance to the erection. The stay sutures of 2-0 PDS were tied over the corporotomies over the implants to complete the closure.      We then turned our attention to fashioning a scrotal pouch. A finger was used to develop a space anterior to the testicles and the pump was gently positioned into the scrotal pouch, taking care to position the pump with the button facing out and the tubing not entangled.  Additional aseptic solution was used to washout the scrotal cavity.     Next we digitally probed the right external ring and then exposed a rightretropubic space to place the implant reservoir by  Elevating the ring with a baby Rogers retractor and blunt finger dissection into the right space of Retzius. A 125cc reservoir was used with 70cc of sterile saline inside. Care was taken to ensure the lock-out valve was positioned anteriorly.      For the peno-scrotal incision, two layers of dartos muscle were approximated using running 2-0 Vicryls, irrigating  the incision copiously after closure of each skin layer. A 10 Swazi round SANCHEZ drain was brought out the dependent portion of the left hemiscrotum, and the tip of this was positioned deep within the scrotum. This was placed to bulb suction and secured to the skin using a nylon drain stitch. The skin was closed using running 4-0 monocryl sutures. The prosthesis was left semi-inflated at the conclusion of the case. Bacitracin ointment and a gauze with supporter were placed around the penis and scrotum.     Postop plan:  - Admit to urology overnight  - Deflate implant tomorrow AM with pain medication beforehand  - Drain to be removed prior to discharge if output <30 ml per shift  - Discharge home tomorrow on bactrim x 7 days    Zackery Veliz MD  Urology Resident    I was present and scrubbed for the entire procedure.  Nathanael Pugh MD  Urology Staff

## 2023-02-07 NOTE — ANESTHESIA PROCEDURE NOTES
Airway       Patient location during procedure: OR  Staff -        Anesthesiologist:  Andres Motta MD       CRNA: Paulette Reyes APRN CRNA       Other Anesthesia Staff: Andie Barros       Performed By: SRNA  Consent for Airway        Urgency: elective  Indications and Patient Condition       Indications for airway management: bhupinder-procedural       Induction type:intravenous       Mask difficulty assessment: 2 - vent by mask + OA or adjuvant +/- NMBA    Final Airway Details       Final airway type: supraglottic airway    Supraglottic Airway Details        Type: LMA       Brand: I-Gel       LMA size: 5    Post intubation assessment        Placement verified by: capnometry, equal breath sounds and chest rise        Number of attempts at approach: 1       Number of other approaches attempted: 0       Secured with: silk tape       Ease of procedure: easy       Dentition: Intact and Unchanged

## 2023-02-07 NOTE — DISCHARGE INSTRUCTIONS
IPP    Activity  - No strenuous exercise or sexual activity for 6 weeks.  - wait to activate the device for the two week appointment at a minimum  - No lifting, pushing, pulling more than 15 pounds for 4 weeks.   - Do not strain with bowel movements.  - Do not drive until you can press the brake pedal quickly and fully without pain.   - Do not operate a motor vehicle while taking narcotic pain medications.     Incisions  - you should wait to inflate your device until the 2 week follow up appointment; you should however feel your device pump within your scrotum daily.   - You may shower and get incisions wet starting 48 hrs after surgery.  - Do not scrub incisions or submerge wounds (aka, bath, pool, hot tub, ect.) for 2 weeks.   - Remove wound dressing 48 hours after surgery.   - you may notice small sutures in your scrotum over the incision. These will slowly dissolve and fall out in the coming weeks  - Leave incision open to air.  Cover with gauze only if needed for comfort or to protect clothing from drainage.   - It is normal for the penis and scrotum to appear bruised and swollen after surgery. This may look worse the first few days after your surgery before it starts to improve.     Medications.  - Do not take more than 4,000mg of Tylenol (acetaminophen) in any 24 hour period, as this can cause liver damage.  - Take stool softeners such as Senna while you are using narcotics, but stop if you develop diarrhea.   - Wean yourself off of narcotic pain medications by transitioning to tylenol keeping in mind the total amount of tylenol ingested as listed above.  - take your antibiotics for the next 7 days as prescribed    Follow-Up:  - Follow up with Dr. Pugh in 2 weeks for wound check  - Call your primary care provider to touch base regarding your recent admission.    - Call or return sooner than your regularly scheduled visit if you develop any of the following:  fever, uncontrolled pain, uncontrolled nausea or  "vomiting, as well as increased redness, swelling, or drainage from your wound.     Phone numbers:   - Monday through Friday 8am to 4:30pm: Call [phone] with questions, requests for medication refills, or to schedule or confirm an appointment.  - Nights or weekends: call the after hours emergency pager - [phone] and tell the  \"I would like to page the Urology Resident on call.\" Please note, due to prescribing laws, resident physicians are unable to prescribe narcotics after-hours. If you feel as though you will need a refill of a narcotic pain medication, you will need to call the clinic during business hours OR seek emergency care.  - For emergencies, call [phone]    "

## 2023-02-07 NOTE — ANESTHESIA POSTPROCEDURE EVALUATION
Patient: Jaime Anderson    Procedure: Procedure(s):  INSERTION of COLOPLAST INFLATABLE PENILE PROSTHESIS       Anesthesia Type:  No value filed.    Note:  Disposition: Admission   Postop Pain Control: Uneventful            Sign Out: Well controlled pain   PONV: No   Neuro/Psych: Uneventful            Sign Out: Acceptable/Baseline neuro status   Airway/Respiratory: Uneventful            Sign Out: Acceptable/Baseline resp. status   CV/Hemodynamics: Uneventful            Sign Out: Acceptable CV status; No obvious hypovolemia; No obvious fluid overload   Other NRE: NONE   DID A NON-ROUTINE EVENT OCCUR? No           Last vitals:  Vitals Value Taken Time   /77 02/07/23 1545   Temp 37.4  C (99.3  F) 02/07/23 1515   Pulse 84 02/07/23 1546   Resp 9 02/07/23 1546   SpO2 97 % 02/07/23 1546   Vitals shown include unvalidated device data.    Electronically Signed By: Andres Motta MD  February 7, 2023  3:47 PM

## 2023-02-07 NOTE — PROGRESS NOTES
Urology  Progress Note    NAEO  Pain controlled    Exam  BP (!) 144/87   Pulse 79   Temp 97.5  F (36.4  C) (Axillary)   Resp 10   Ht 1.829 m (6')   Wt 104.6 kg (230 lb 9.6 oz)   SpO2 98%   BMI 31.28 kg/m    No acute distress  Unlabored breathing  Abdomen soft, nontender, nondistended. Incisions c/d/i, some scrotal bruising. Pump well seated in anterior scrotum.  SANCHEZ serosanguinous  Hurley with clear urine in tubing    /400  SANCHEZ 50/0    Labs    AM labs pending    Assessment/Plan  52 year old y/o male POD#1 s/p IPP for ED.     Neuro: APAP, oxy, dilaudid for pain control  CV: KATHRYN  Pulm: incentive spirometry while awake  FEN/GI: regular diet  Endo: KATHRYN  : Hurley out, please obtain post-void residual and document in chart. IPP taken down.  Heme/ID: Bactrim x7 days  Activity: up ad rachana  PPx: SCDs    Seen and examined with the chief resident. Will discuss with Dr. Pugh.    Zackery Veliz MD, PGY-3  Urology Resident     Contacting the Urology Team     Please use the following job codes to reach the Urology Team. Note that you must use an in house phone and that job codes cannot receive text pages.     On weekdays, dial [phone] (or star-star-star [phone] on the new [employer] telephones) then [phone] to reach the Adult Urology resident or PA on call    On weekdays, dial [phone] (or star-star-star [phone] on the new [employer] telephones) then [phone] to reach the Pediatric Urology resident    On weeknights and weekends, dial [phone] (or star-star-star [phone] on the new [employer] telephones) then [phone] to reach the Urology resident on call (for both Adult and Pediatrics)

## 2023-02-07 NOTE — ANESTHESIA CARE TRANSFER NOTE
Patient: Jaime Anderson    Procedure: Procedure(s):  INSERTION of COLOPLAST INFLATABLE PENILE PROSTHESIS       Diagnosis: Erectile dysfunction, unspecified erectile dysfunction type [N52.9]  Diagnosis Additional Information: No value filed.    Anesthesia Type:   No value filed.     Note:    Oropharynx: spontaneously breathing and oropharynx clear of all foreign objects  Level of Consciousness: drowsy  Oxygen Supplementation: face mask  Level of Supplemental Oxygen (L/min / FiO2): 8  Independent Airway: airway patency satisfactory and stable  Dentition: dentition unchanged  Vital Signs Stable: post-procedure vital signs reviewed and stable  Report to RN Given: handoff report given  Patient transferred to: PACU    Handoff Report: Identifed the Patient, Identified the Reponsible Provider, Reviewed the pertinent medical history, Discussed the surgical course, Reviewed Intra-OP anesthesia mangement and issues during anesthesia, Set expectations for post-procedure period and Allowed opportunity for questions and acknowledgement of understanding      Vitals:  Vitals Value Taken Time   /73 02/07/23 1515   Temp 37.4 02/07/23 1518   Pulse 94 02/07/23 1521   Resp 7 02/07/23 1521   SpO2 97 % 02/07/23 1521   Vitals shown include unvalidated device data.    Electronically Signed By: DEBBIE Santana CRNA  February 7, 2023  3:22 PM

## 2023-02-07 NOTE — OR NURSING
PACU to Inpatient Nursing Handoff    Patient Jaime Anderson is a 52 year old male who speaks English.   Procedure Procedure(s):  INSERTION of COLOPLAST INFLATABLE PENILE PROSTHESIS   Surgeon(s) Primary: Nathanael Pugh MD  Resident - Assisting: Zackery Veliz MD     Allergies   Allergen Reactions     Tuberculin Purified Protein Derivative Swelling       Isolation  No active isolations     Past Medical History   has a past medical history of Erectile dysfunction, unspecified erectile dysfunction type, HLD (hyperlipidemia), HTN (hypertension), Hypogonadism male, Obesity, ANNE (obstructive sleep apnea), PTSD (post-traumatic stress disorder), SVT (supraventricular tachycardia) (H), and Type 2 diabetes mellitus (H).    Anesthesia General   Dermatome Level     Preop Meds Not applicable   Nerve block Not applicable   Intraop Meds dexamethasone (Decadron)  dexmedetomidine (Precedex): 8 mcg total  fentanyl (Sublimaze): 100 mcg total  hydromorphone (Dilaudid): 1.0 mg total  ondansetron (Zofran): last given at 1418   Local Meds Yes   Antibiotics cefazolin (Ancef) - last given at 1310  gentamicin (Garamycin) - last given at 1255     Pain Patient Currently in Pain: yes   PACU meds  fentanyl (Sublimaze): 100 mcg (total dose) last given at 1542   hydromorphone (Dilaudid): 0.4 mg (total dose) last given at 1627   none   PCA / epidural No   Capnography     Telemetry ECG Rhythm: Normal sinus rhythm   Inpatient Telemetry Monitor Ordered? No        Labs Glucose Lab Results   Component Value Date     02/07/2023       Hgb Lab Results   Component Value Date    HGB 14.6 07/11/2011       INR No results found for: INR   PACU Imaging Not applicable     Wound/Incision Incision/Surgical Site 11/29/10 Right Ankle (Active)   Number of days: 4453       Incision/Surgical Site 02/07/23 Scrotum (Active)   Incision Assessment UTV 02/07/23 1520   Closure CHHAYA 02/07/23 1520   Incision Drainage Amount Scant 02/07/23 1520   Drainage Description  Serosanguinous 02/07/23 1520   Number of days: 0      CMS  intermittent numbness bilat feet.       Equipment Not applicable   Other LDA       IV Access Peripheral IV 02/07/23 Left Hand (Active)   Site Assessment WDL 02/07/23 1515   Line Status Infusing 02/07/23 1515   Dressing Intervention Dressing reinforced 02/07/23 1515   Phlebitis Scale 0-->no symptoms 02/07/23 1515   Infiltration Scale 0 02/07/23 1515   If infiltrated, was a vesicant infusing? No 02/07/23 1515   Number of days: 0      Blood Products Not applicable EBL 20   mL   Intake/Output Date 02/07/23 0700 - 02/08/23 0659   Shift 6057-6218 6681-3760 8524-9152 24 Hour Total   INTAKE   I.V. 700 300  1000   Shift Total(mL/kg) 700(6.69) 300(2.87)  1000(9.56)   OUTPUT   Shift Total(mL/kg)       Weight (kg) 104.6 104.6 104.6 104.6      Drains / Hurley Closed/Suction Drain 1 Right Other (Comment) Bulb 10 Persian (Active)   Site Description [facility] 02/07/23 1515   Drainage Appearance Normal;Bloody/Bright Red 02/07/23 1515   Status To bulb suction 02/07/23 1515   Number of days: 0       Urethral Catheter 02/07/23 Non-latex;Straight-tip 16 fr (Active)   Tube Description [facility] 02/07/23 1515   Collection Container Standard 02/07/23 1515   Securement Method Securing device (Describe) 02/07/23 1515   Rationale for Continued Use /GI/GYN Pelvic Procedure 02/07/23 1515   Number of days: 0      Time of void PreOp Void Prior to Procedure: 0800 (02/07/23 1118)    PostOp      Diapered? No   Bladder Scan     PO    ice chips crackers     Vitals    B/P: (!) 143/77  T: 99.3  F (37.4  C)    Temp src: Axillary  P:  Pulse: 85 (02/07/23 1545)          R: 16  O2:  SpO2: 98 %    O2 Device: Nasal cannula (02/07/23 1545)    Oxygen Delivery: 4 LPM (02/07/23 1545)         Family/support present significant other   Patient belongings     Patient transported on cart   DC meds/scripts (obs/outpt) Not applicable   Inpatient Pain Meds Released? Yes       Special needs/considerations None   Tasks  needing completion None       Paulette Moreno, RN  [facility] [id number]

## 2023-02-07 NOTE — TELEPHONE ENCOUNTER
Per chart review, patient is scheduled for surgery today and is currently in pre-op. Will close encounter at this time.    Palak Chilel RN, BSN

## 2023-02-08 VITALS
SYSTOLIC BLOOD PRESSURE: 133 MMHG | OXYGEN SATURATION: 99 % | TEMPERATURE: 98 F | HEIGHT: 72 IN | RESPIRATION RATE: 16 BRPM | BODY MASS INDEX: 31.23 KG/M2 | WEIGHT: 230.6 LBS | DIASTOLIC BLOOD PRESSURE: 72 MMHG | HEART RATE: 94 BPM

## 2023-02-08 LAB
ANION GAP SERPL CALCULATED.3IONS-SCNC: 11 MMOL/L (ref 7–15)
BUN SERPL-MCNC: 19.5 MG/DL (ref 6–20)
CALCIUM SERPL-MCNC: 9.1 MG/DL (ref 8.6–10)
CHLORIDE SERPL-SCNC: 102 MMOL/L (ref 98–107)
CREAT SERPL-MCNC: 0.82 MG/DL (ref 0.67–1.17)
DEPRECATED HCO3 PLAS-SCNC: 23 MMOL/L (ref 22–29)
ERYTHROCYTE [DISTWIDTH] IN BLOOD BY AUTOMATED COUNT: 12.9 % (ref 10–15)
GFR SERPL CREATININE-BSD FRML MDRD: >90 ML/MIN/1.73M2
GLUCOSE BLDC GLUCOMTR-MCNC: 140 MG/DL (ref 70–99)
GLUCOSE BLDC GLUCOMTR-MCNC: 158 MG/DL (ref 70–99)
GLUCOSE BLDC GLUCOMTR-MCNC: 205 MG/DL (ref 70–99)
GLUCOSE SERPL-MCNC: 169 MG/DL (ref 70–99)
HCT VFR BLD AUTO: 43.5 % (ref 40–53)
HGB BLD-MCNC: 14.1 G/DL (ref 13.3–17.7)
MCH RBC QN AUTO: 29.8 PG (ref 26.5–33)
MCHC RBC AUTO-ENTMCNC: 32.4 G/DL (ref 31.5–36.5)
MCV RBC AUTO: 92 FL (ref 78–100)
PLATELET # BLD AUTO: 239 10E3/UL (ref 150–450)
POTASSIUM SERPL-SCNC: 4.1 MMOL/L (ref 3.4–5.3)
RBC # BLD AUTO: 4.73 10E6/UL (ref 4.4–5.9)
SODIUM SERPL-SCNC: 136 MMOL/L (ref 136–145)
WBC # BLD AUTO: 13.8 10E3/UL (ref 4–11)

## 2023-02-08 PROCEDURE — 80048 BASIC METABOLIC PNL TOTAL CA: CPT | Performed by: UROLOGY

## 2023-02-08 PROCEDURE — 85027 COMPLETE CBC AUTOMATED: CPT | Performed by: UROLOGY

## 2023-02-08 PROCEDURE — 250N000013 HC RX MED GY IP 250 OP 250 PS 637: Performed by: UROLOGY

## 2023-02-08 PROCEDURE — 36415 COLL VENOUS BLD VENIPUNCTURE: CPT | Performed by: UROLOGY

## 2023-02-08 PROCEDURE — 250N000011 HC RX IP 250 OP 636: Performed by: UROLOGY

## 2023-02-08 PROCEDURE — 258N000003 HC RX IP 258 OP 636: Performed by: UROLOGY

## 2023-02-08 RX ORDER — HYDROMORPHONE HYDROCHLORIDE 1 MG/ML
0.5 INJECTION, SOLUTION INTRAMUSCULAR; INTRAVENOUS; SUBCUTANEOUS ONCE
Status: COMPLETED | OUTPATIENT
Start: 2023-02-08 | End: 2023-02-08

## 2023-02-08 RX ADMIN — INSULIN ASPART 1 UNITS: 100 INJECTION, SOLUTION INTRAVENOUS; SUBCUTANEOUS at 07:50

## 2023-02-08 RX ADMIN — OXYCODONE HYDROCHLORIDE 5 MG: 5 TABLET ORAL at 11:11

## 2023-02-08 RX ADMIN — METFORMIN HYDROCHLORIDE 1000 MG: 500 TABLET, FILM COATED ORAL at 07:48

## 2023-02-08 RX ADMIN — Medication 12.5 MG: at 07:48

## 2023-02-08 RX ADMIN — FAMOTIDINE 20 MG: 20 TABLET, FILM COATED ORAL at 07:48

## 2023-02-08 RX ADMIN — LISINOPRIL 20 MG: 20 TABLET ORAL at 07:48

## 2023-02-08 RX ADMIN — OXYCODONE HYDROCHLORIDE 5 MG: 5 TABLET ORAL at 07:08

## 2023-02-08 RX ADMIN — OXYCODONE HYDROCHLORIDE 5 MG: 5 TABLET ORAL at 03:04

## 2023-02-08 RX ADMIN — SULFAMETHOXAZOLE AND TRIMETHOPRIM 1 TABLET: 800; 160 TABLET ORAL at 07:47

## 2023-02-08 RX ADMIN — SODIUM CHLORIDE, POTASSIUM CHLORIDE, SODIUM LACTATE AND CALCIUM CHLORIDE: 600; 310; 30; 20 INJECTION, SOLUTION INTRAVENOUS at 00:37

## 2023-02-08 RX ADMIN — LIDOCAINE PATCH 4% 1 PATCH: 40 PATCH TOPICAL at 07:48

## 2023-02-08 RX ADMIN — ACETAMINOPHEN 650 MG: 325 TABLET ORAL at 04:59

## 2023-02-08 RX ADMIN — GLIPIZIDE 5 MG: 5 TABLET ORAL at 07:48

## 2023-02-08 RX ADMIN — INSULIN ASPART 1 UNITS: 100 INJECTION, SOLUTION INTRAVENOUS; SUBCUTANEOUS at 12:52

## 2023-02-08 RX ADMIN — HYDROMORPHONE HYDROCHLORIDE 0.5 MG: 1 INJECTION, SOLUTION INTRAMUSCULAR; INTRAVENOUS; SUBCUTANEOUS at 06:10

## 2023-02-08 RX ADMIN — ATORVASTATIN CALCIUM 20 MG: 20 TABLET, FILM COATED ORAL at 07:47

## 2023-02-08 RX ADMIN — ACETAMINOPHEN 650 MG: 325 TABLET ORAL at 11:46

## 2023-02-08 RX ADMIN — ONDANSETRON 4 MG: 4 TABLET, ORALLY DISINTEGRATING ORAL at 12:54

## 2023-02-08 ASSESSMENT — ACTIVITIES OF DAILY LIVING (ADL)
ADLS_ACUITY_SCORE: 22

## 2023-02-08 NOTE — PLAN OF CARE
Patient is alert and ox4, VSS, on RA,     C/o mild-moderate pain at incision site, managed well with PRN Tylenol and Oxy 5 mg.   Hurley abn SANCHEZ drain were removed by provider this AM, and pt voiding spontaneously, PVR 75 ml.   Incision dressing CDI.     IND ,    Denied chest pain, SOB, N/T,   Good appetite on Regular diet,   Passing gas,     Plan to discharge home at noon.

## 2023-02-08 NOTE — DISCHARGE SUMMARY
Discharge Summary     Jaime Anderson MRN# [id number]   YOB: 1970 Age: 52 year old     Date of Admission:  2/6/2023  Date of Discharge::  2/7/2023   Admitting Physician:  Nathanael Pugh MD  Discharge Physician:  Zackery Veliz MD  Primary Care Physician:         No Ref-Primary, Physician          Admission Diagnoses:   Erectile dysfunction, unspecified erectile dysfunction type [N52.9]  Erectile dysfunction [N52.9]            Discharge Diagnosis:   Same as above           Procedures:   : Procedure(s):  INSERTION of COLOPLAST INFLATABLE PENILE PROSTHESIS        Non-operative procedures:   None performed          Consultations:   None           Imaging Studies:     Results for orders placed or performed during the hospital encounter of 11/29/10   X-ray Surgery ARLENE rt ankle    Impression       SURGERY C-ARM RIGHT ANKLE     Nov 29, 2010 6:02 PM      HISTORY: Surgery C-arm right ankle.      COMPARISON: None.      FINDINGS: Three spot intraoperative fluoro views were provided for  interpretation. Initial exam demonstrates a clamp holding the lateral  malleolus together. The second image demonstrates holes through the  medial and lateral malleolus and syndesmosis with suture anchors along  the medial aspect of the medial malleolus and on the lateral aspect of  the lateral malleolus. Syndesmosis appears to be normal on this second  image. Mild irregularity of the posterior malleolus is noted. Molina  class C distal fibular fracture is partially imaged.     IMPRESSION: Postop changes as described above.            Medications Prior to Admission:     Medications Prior to Admission   Medication Sig Dispense Refill Last Dose     atorvastatin (LIPITOR) 40 MG tablet Take 20 mg by mouth every morning   2/7/2023 at 0800     Bioflavonoid Products (VITAMIN C) CHEW Take 1 tablet by mouth every morning   Past Week     empagliflozin (JARDIANCE) 25 MG TABS tablet Take 0.5 tablets by mouth  every morning   Past Week     glipiZIDE (GLUCOTROL) 5 MG tablet Take 5 mg by mouth daily   2/6/2023     lidocaine (LIDODERM) 5 % patch APPLY 1 PATCH TOPICALLY AS NEEDED FOR PAIN. WEAR FOR ONLY 12 HOURS THEN REMOVE FOR 12 HOURS.   2/6/2023     lisinopril (ZESTRIL) 40 MG tablet Take 20 mg by mouth every morning   2/6/2023     metFORMIN (GLUCOPHAGE) 1000 MG tablet Take 1,000 mg by mouth 2 times daily (with meals)   2/6/2023     metoprolol tartrate (LOPRESSOR) 25 MG tablet Take 0.5 tablets by mouth 2 times daily   2/6/2023     multivitamin w/minerals (THERA-VIT-M) tablet Take 1 tablet by mouth every morning   Past Week     Psyllium 33 % POWD TAKE 1 TABLESPOONFUL BY MOUTH EVERY DAY MIXED IN JUICE OR WATER AS DIRECTED FOR FIBER   Past Week     semaglutide (OZEMPIC) 2 MG/1.5ML SOPN pen 0.5 mg every 7 days On Saturday        testosterone cypionate (DEPOTESTOSTERONE) 200 MG/ML injection Inject 50 mg into the muscle every 14 days   Past Week at n/a     [DISCONTINUED] Alprostadil, Vasodilator, 40 MCG SOLR by INTRACAVERNOSAL route daily as needed   Past Week            Discharge Medications:     Current Discharge Medication List      START taking these medications    Details   oxyCODONE (ROXICODONE) 5 MG tablet Take 1-2 tablets (5-10 mg) by mouth every 6 hours as needed for pain  Qty: 15 tablet, Refills: 0    Associated Diagnoses: Postoperative pain      senna-docusate (SENOKOT-S/PERICOLACE) 8.6-50 MG tablet Take 1 tablet by mouth 2 times daily as needed for constipation  Qty: 30 tablet, Refills: 0    Associated Diagnoses: Erectile dysfunction, unspecified erectile dysfunction type      sulfamethoxazole-trimethoprim (BACTRIM DS) 800-160 MG tablet Take 1 tablet by mouth 2 times daily  Qty: 14 tablet, Refills: 0    Associated Diagnoses: Erectile dysfunction, unspecified erectile dysfunction type         CONTINUE these medications which have NOT CHANGED    Details   atorvastatin (LIPITOR) 40 MG tablet Take 20 mg by mouth every  morning      Bioflavonoid Products (VITAMIN C) CHEW Take 1 tablet by mouth every morning      empagliflozin (JARDIANCE) 25 MG TABS tablet Take 0.5 tablets by mouth every morning      glipiZIDE (GLUCOTROL) 5 MG tablet Take 5 mg by mouth daily      lidocaine (LIDODERM) 5 % patch APPLY 1 PATCH TOPICALLY AS NEEDED FOR PAIN. WEAR FOR ONLY 12 HOURS THEN REMOVE FOR 12 HOURS.      lisinopril (ZESTRIL) 40 MG tablet Take 20 mg by mouth every morning      metFORMIN (GLUCOPHAGE) 1000 MG tablet Take 1,000 mg by mouth 2 times daily (with meals)      metoprolol tartrate (LOPRESSOR) 25 MG tablet Take 0.5 tablets by mouth 2 times daily      multivitamin w/minerals (THERA-VIT-M) tablet Take 1 tablet by mouth every morning      Psyllium 33 % POWD TAKE 1 TABLESPOONFUL BY MOUTH EVERY DAY MIXED IN JUICE OR WATER AS DIRECTED FOR FIBER      semaglutide (OZEMPIC) 2 MG/1.5ML SOPN pen 0.5 mg every 7 days On Saturday      testosterone cypionate (DEPOTESTOSTERONE) 200 MG/ML injection Inject 50 mg into the muscle every 14 days         STOP taking these medications       Alprostadil, Vasodilator, 40 MCG SOLR Comments:   Reason for Stopping:                      Brief History of Illness:   Reason for admission requiring a surgical or invasive procedure:   Erectile dysfunction, unspecified erectile dysfunction type [N52.9]   The patient underwent the following procedure(s):   See above   There were no immediate complications during this procedure.    Please refer to the full operative summary for details.           Hospital Course:   The patient's hospital course was unremarkable.  Jaime Anderson recovered as anticipated and experienced no post-operative complications.      On POD#1 patient was ambulating without assitance, tolerating a regular diet, had pain controlled with PO medications to go home with, and requiring no IV medications or fluids. Patient was discharged home with appropriate contact information, follow-up and instructions as seen  below in the discharge paperwork.         Final Pathology Result:   Pending at time of discharge         Discharge Instructions and Follow-Up:     Discharge Procedure Orders   Reason for your hospital stay   Order Comments: Urologic Surgery     Reason for your hospital stay   Order Comments: On 2/7/23 Mr. Anderson underwent an inflatable penile prosthesis     Activity   Order Comments: Your activity upon discharge: see discharge instructions     Order Specific Question Answer Comments   Is discharge order? Yes      Discharge Instructions   Order Comments: Diet:   - Return to home diet    Activity:  - No strenuous exercise or sexual activity for 6 weeks.  - Wait to activate the device for the two week appointment at a minimum  - No lifting, pushing, pulling more than 15 pounds for 4 weeks.   - Do not strain with bowel movements.  - Do not drive until you can press the brake pedal quickly and fully without pain.   - Do not operate a motor vehicle while taking narcotic pain medications.     Incisions:  - Wait to inflate your device until you are instructed to do so at the 2 week follow up appointment with Dr. Pugh; It is, however, recommended that you check the positioning of your pump within the scrotum daily to become familiar with its positioning.   - You may shower and get incisions wet starting 48 hrs after surgery.  - Do not scrub incisions or submerge wounds (aka, bath, pool, hot tub, ect.) until incisions have fully healed, typically 4 weeks  - Remove wound dressing 48 hours after surgery.   - You may notice small sutures in your scrotum over the incision. These will slowly dissolve and fall out in the coming weeks  - Leave incision open toair.  Cover with gauze only if needed for comfort or to protect clothing from drainage.     Medications:  PAIN: Oxycodone is a narcotic medication that has been prescribed for pain.  Narcotics will cause sleepiness and constipation and can become addictive, therefore it is  best to stop or reduce them as soon as you can.  Any left over narcotics should be disposed of with an Authorized  for unneeded medications.  Contact your [facility]'s or [facility]'s household trash and recycling service to learn about medication disposal options and guidelines for your area.  If you decide to store this medication at home it should be kept in a locked cabinet to prevent access to children or visitors. Never drive, operate machinery or drink alcoholic beverages while you are taking narcotic pain medications.  To reduce your narcotic use, take both Tylenol (acetaminophen 625mg) and ibuprofen (600mg), alternating between these medications every 3 hours.   Do not take more than 4,000mg of Tylenol (acetaminophen/ APAP) from all sources in any 24 hour period since this can cause liver damage.  Do not take more than 2400mg of ibuprofen in any 24 hour period since this can cause kidney damage.     BOWELS: Take stool softeners such as Senna while you are using narcotics, but stop if you develop diarrhea.     ANTIBIOTICS:  Take your antibiotics for the next 7 days as prescribed     Adult [facility] Follow-up and recommended labs and tests   Order Comments: Follow-Up:   - Schedule an appointment to be seen by your primary care provider within 7-10 days of discharge for a postoperative checkup.   - Follow up with Dr. Pugh in two weeks for a wound check  - Call or return sooner than your regularly scheduled visit if you develop any of the following:  Fever (greater than 101.3F), uncontrolled pain, uncontrolled nausea or vomiting, concerns about bowel function, as well as increased redness, swelling, drainage from your wound or any concerns about urinating or urinary catheter drainage.      Phone numbers:   - Monday through Friday 8am to 4:30pm: Call [phone] with questions, requests for medication refills, or to schedule or confirm an appointment.  - Nights, weekends, or holidays: call the after  "hours emergency pager - [phone] and tell the  \"I would like to page the Urology Resident on call.\" Typically, the on-call provider should return your call within 30 minutes.  Please page the on-call provider again if you haven't been contacted as expected.  Rarely, the on-call provider will be unable to promptly return a call due to a hospital emergency.  If you have paged twice and are still not contacted, ask the hospital  to page the \"urology CHIEF-RESIDENT on call\".   - Please note that due to prescribing laws, resident physicians are unable to prescribe narcotics after-hours. If you feel as though you will need a refill of a narcotic pain medication, you will need to call the clinic during business hours OR seek emergency care.  - For emergencies, call 911    Appointments on [place] and/or [facility] (with [facility] or [facility] provider or service). Call [phone] if you haven't heard regarding these appointments within 7 days of discharge.     Diet   Order Comments: Follow this diet upon discharge: see discharge instructions     Order Specific Question Answer Comments   Is discharge order? Yes             Discharge Disposition:     Discharged to Home      Condition at discharge: Good    --    Zackery Veliz MD  Urology Resident    3:37 PM, 2/8/2023    "

## 2023-02-08 NOTE — PLAN OF CARE
/69 (BP Location: Right arm)   Pulse 86   Temp 97.5  F (36.4  C) (Axillary)   Resp 13   Ht 1.829 m (6')   Wt 104.6 kg (230 lb 9.6 oz)   SpO2 95%   BMI 31.28 kg/m      A&Ox4.   Baseline numbness in BLE.   Great upper bilateral extremity strengths. Moderate to good bilateral lower extremity strengths due to pressure per patient.    Denies chest pain, SOB.  PRN oxycodone and tylenol given per MAR. Ice packs given  Tolerating PO fluids and regular diet  LBM 02/07 before surgery per patient. Passing gas. BS active on all 4 quadrant   Dressing-CDI  [place]ley patent with adequate output.    SANCHEZ with total of 10 ml out put in 12 hrs    Continue to monitor

## 2023-02-08 NOTE — PHARMACY-ADMISSION MEDICATION HISTORY
Admission Medication History Completed by Pharmacy    See [facility] Admission Navigator for allergy information, preferred outpatient pharmacy, prior to admission medications and immunization status.     Medication History Sources:     Patient    [facility] medication list on Care Everywhere    [place] PDM    Changes made to PTA medication list (reason):    Added: testosterone (per patient, [facility], and PDMP)    Deleted: duplicate lisinopril 10 mg     Changed: Added directions to vitamin C (patient purchases OTC so unsure of strength of tablet)    Additional Information:    Last filled testosterone 200 mg/mL on 12/15/22 for #2 vials. Patient reports last dose was this past weekend.    Prior to Admission medications    Medication Sig Last Dose Taking? Auth Provider Long Term End Date   atorvastatin (LIPITOR) 40 MG tablet Take 20 mg by mouth every morning 2/7/2023 at 0800 Yes Reported, Patient Yes    Bioflavonoid Products (VITAMIN C) CHEW Take 1 tablet by mouth every morning Past Week Yes Reported, Patient     empagliflozin (JARDIANCE) 25 MG TABS tablet Take 0.5 tablets by mouth every morning Past Week Yes Reported, Patient     glipiZIDE (GLUCOTROL) 5 MG tablet Take 5 mg by mouth daily 2/6/2023 Yes Reported, Patient Yes    lidocaine (LIDODERM) 5 % patch APPLY 1 PATCH TOPICALLY AS NEEDED FOR PAIN. WEAR FOR ONLY 12 HOURS THEN REMOVE FOR 12 HOURS. 2/6/2023 Yes Reported, Patient     lisinopril (ZESTRIL) 40 MG tablet Take 20 mg by mouth every morning 2/6/2023 Yes Reported, Patient     metFORMIN (GLUCOPHAGE) 1000 MG tablet Take 1,000 mg by mouth 2 times daily (with meals) 2/6/2023 Yes Reported, Patient Yes    metoprolol tartrate (LOPRESSOR) 25 MG tablet Take 0.5 tablets by mouth 2 times daily 2/6/2023 Yes Reported, Patient     multivitamin w/minerals (THERA-VIT-M) tablet Take 1 tablet by mouth every morning Past Week Yes Reported, Patient     oxyCODONE (ROXICODONE) 5 MG tablet Take 1-2 tablets (5-10 mg) by mouth every 6 hours as needed  for pain  Yes Nathanael Pugh MD  2/10/23   Psyllium 33 % POWD TAKE 1 TABLESPOONFUL BY MOUTH EVERY DAY MIXED IN JUICE OR WATER AS DIRECTED FOR FIBER Past Week Yes Reported, Patient     semaglutide (OZEMPIC) 2 MG/1.5ML SOPN pen 0.5 mg every 7 days On Saturday  Yes Reported, Patient     senna-docusate (SENOKOT-S/PERICOLACE) 8.6-50 MG tablet Take 1 tablet by mouth 2 times daily as needed for constipation  Yes Nathanael Pugh MD     sulfamethoxazole-trimethoprim (BACTRIM DS) 800-160 MG tablet Take 1 tablet by mouth 2 times daily  Yes Nathanael Pugh MD     testosterone cypionate (DEPOTESTOSTERONE) 200 MG/ML injection Inject 50 mg into the muscle every 14 days Past Week at n/a Yes Unknown, Entered By History Yes        Date completed: 02/07/23    Medication history completed by: Evelyn Hernandez, [facility]

## 2023-02-08 NOTE — PLAN OF CARE
New admitted this PM from PACU due to penile prosthesis.     Patient is alert and ox4, on RA, on CAPNO.     Mild pain at incision site, no PRN pain med given since arrived this floor,  Incision site dressing CDI, villa catheter patent, SANCHEZ drain in place,     Denied chest pain, SOB, nausea, N/T.  Tolerated well with eating and drinking water,   Passing gas.    BG checked before meals with sliding scale.   L PIV, infusing  ml/hr.     Cont of POC.

## 2023-02-22 ENCOUNTER — OFFICE VISIT (OUTPATIENT)
Dept: UROLOGY | Facility: CLINIC | Age: 53
End: 2023-02-22
Payer: COMMERCIAL

## 2023-02-22 DIAGNOSIS — N52.9 ERECTILE DYSFUNCTION, UNSPECIFIED ERECTILE DYSFUNCTION TYPE: Primary | ICD-10-CM

## 2023-02-22 DIAGNOSIS — Z09 POSTOP CHECK: ICD-10-CM

## 2023-02-22 PROCEDURE — 99024 POSTOP FOLLOW-UP VISIT: CPT | Performed by: UROLOGY

## 2023-02-22 ASSESSMENT — PAIN SCALES - GENERAL: PAINLEVEL: NO PAIN (0)

## 2023-02-22 NOTE — NURSING NOTE
Jaime Anderson's chief complaint for this visit includes:  Chief Complaint   Patient presents with     Follow Up     Post op 2/7/23.     PCP: No Ref-Primary, Physician    Referring Provider:  No referring provider defined for this encounter.    There were no vitals taken for this visit.  No Pain (0)        Allergies   Allergen Reactions     Tuberculin Purified Protein Derivative Swelling         Do you need any medication refills at today's visit? No    Karon Collins RN

## 2023-02-22 NOTE — LETTER
Patient:  Jaime Anderson  :   1970  MRN:     [id number]      2023    Patient Name:  Jaime Anderson    Physician: Nathanael Pugh MD    Patientmay return to work on 23.    his next clinic appointment is scheduled for 3/15/23.    Patient Limitations:    None and Patient should not engage in activities involving lifting over 15 lbs.  This restriction should be in place until  or later.      [facility] Urology Team    Karon Collins RN for Dr. Nathanael Pugh        [id number]  1970

## 2023-02-23 NOTE — PROGRESS NOTES
CC: Jaime Anderson  is post-op from IPP, [place], done on 2/7/23  HPI: Patient is 2 wks post-op.  He has been doing well. No fevers or chills. Pain decreasing.  Mild ecchymosis that is still clearing.   Exam: There were no vitals taken for this visit. . NAD.   Incision healing well. No discharge, erythema or fluctuence suggestive of infection.   Penis is deflated , pump is palpable a little posterior in the scrotum.  Model pump given.      PLAN:     Instructed him not to activate the device, to continue to feel for the pump. Use bacitracin on the incision.    Return to clinic 3-4 weeks for activation.

## 2023-03-15 ENCOUNTER — OFFICE VISIT (OUTPATIENT)
Dept: UROLOGY | Facility: CLINIC | Age: 53
End: 2023-03-15
Payer: COMMERCIAL

## 2023-03-15 DIAGNOSIS — N52.9 ERECTILE DYSFUNCTION, UNSPECIFIED ERECTILE DYSFUNCTION TYPE: ICD-10-CM

## 2023-03-15 DIAGNOSIS — Z09 POSTOP CHECK: Primary | ICD-10-CM

## 2023-03-15 PROCEDURE — 99024 POSTOP FOLLOW-UP VISIT: CPT | Performed by: UROLOGY

## 2023-03-15 NOTE — NURSING NOTE
Jaime Anderson's goals for this visit include:   Chief Complaint   Patient presents with     RECHECK     post op DOS 2/7/23    Implant inflatable penile prosthesis         He requests these members of his care team be copied on today's visit information:       PCP: No Ref-Primary, Physician    Referring Provider:  No referring provider defined for this encounter.    There were no vitals taken for this visit.    Do you need any medication refills at today's visit?     Kinga Branch LPN on 3/15/2023 at 2:54 PM

## 2023-03-15 NOTE — PROGRESS NOTES
CC: Jaime PINEDA Lorenamayra  is post-op from IPP, [place], done on 2/7/23  HPI: Patient is 5 wks post-op.  He has been doing well. No fevers or chills. Pain resolved    Exam:   NAD.   Incision healing well. No discharge, erythema or fluctuence suggestive of infection.   Penis is deflated , pump is palpable a little posterior in the scrotum.  Cylinders are in good position.    PLAN:   Inflate and deflate x 5-10 min 1-2x day.  If this this is going OK for a week, OK for intercourse next week.  PRN follow-up with urology.

## (undated) DEVICE — SUPPORTER ATHLETIC LG LATEX 202636

## (undated) DEVICE — SOL WATER IRRIG 1000ML BOTTLE 2F7114

## (undated) DEVICE — POSITIONER ARMBOARD FOAM 1PAIR LF FP-ARMB1

## (undated) DEVICE — DRAPE LAP TRANSVERSE 29421

## (undated) DEVICE — SYR 20ML LL W/O NDL 302830

## (undated) DEVICE — SOL NACL 0.9% IRRIG 1000ML BOTTLE 2F7124

## (undated) DEVICE — SYR 50ML LL W/O NDL 309653

## (undated) DEVICE — DRAIN JACKSON PRATT 10FR ROUND SU130-1321

## (undated) DEVICE — SU VICRYL 2-0 SH 27" UND J417H

## (undated) DEVICE — LINEN ORTHO PACK 5446

## (undated) DEVICE — SUCTION MANIFOLD NEPTUNE 2 SYS 4 PORT 0702-020-000

## (undated) DEVICE — LINEN TOWEL PACK X5 5464

## (undated) DEVICE — GLOVE BIOGEL PI MICRO SZ 7.5 48575

## (undated) DEVICE — TUBING SUCTION MEDI-VAC 1/4"X20' N620A

## (undated) DEVICE — Device

## (undated) DEVICE — RETR PENILE WILSON XL 12"  TLC-5042-M

## (undated) DEVICE — SYR BULB IRRIG DOVER 60 ML LATEX FREE 67000

## (undated) DEVICE — DRSG KERLIX FLUFFS X5

## (undated) DEVICE — DRSG KERLIX 4 1/2"X4YDS ROLL 6730

## (undated) DEVICE — WIPES FOLEY CARE SURESTEP PROVON DFC100

## (undated) DEVICE — PREP CHLORAPREP 26ML TINTED HI-LITE ORANGE 930815

## (undated) DEVICE — CATH PLUG W/CAP 000076

## (undated) DEVICE — LINEN GOWN X4 5410

## (undated) DEVICE — DRAPE MAYO STAND 23X54 8337

## (undated) DEVICE — PAD CHUX UNDERPAD 30X36" P3036C

## (undated) DEVICE — DRAPE IOBAN INCISE 13X13" 6640EZ

## (undated) DEVICE — SU ETHILON 2-0 PS 18" 585H

## (undated) DEVICE — CLEANSER JET LAVAGE IRRISEPT 0.05% CHG IRRISEPT45USA

## (undated) DEVICE — ADH SKIN CLOSURE PREMIERPRO EXOFIN 1.0ML 3470

## (undated) DEVICE — SU PDS II 2-0 CT-2 27"  Z333H

## (undated) DEVICE — ESU GROUND PAD UNIVERSAL W/O CORD

## (undated) DEVICE — SU MONOCRYL 4-0 PS-2 18" UND Y496G

## (undated) DEVICE — CATH TRAY FOLEY SURESTEP 16FR WDRAIN BAG STLK LATEX A300316A

## (undated) DEVICE — DRAIN JACKSON PRATT RESERVOIR 100ML SU130-1305

## (undated) DEVICE — LABEL MEDICATION SYSTEM 3303-P

## (undated) DEVICE — SUCTION TIP YANKAUER W/O VENT K86

## (undated) RX ORDER — HYDROMORPHONE HYDROCHLORIDE 1 MG/ML
INJECTION, SOLUTION INTRAMUSCULAR; INTRAVENOUS; SUBCUTANEOUS
Status: DISPENSED
Start: 2023-02-07

## (undated) RX ORDER — BUPIVACAINE HYDROCHLORIDE 5 MG/ML
INJECTION, SOLUTION EPIDURAL; INTRACAUDAL
Status: DISPENSED
Start: 2023-02-07

## (undated) RX ORDER — CEFAZOLIN SODIUM/WATER 2 G/20 ML
SYRINGE (ML) INTRAVENOUS
Status: DISPENSED
Start: 2023-02-07

## (undated) RX ORDER — FENTANYL CITRATE 50 UG/ML
INJECTION, SOLUTION INTRAMUSCULAR; INTRAVENOUS
Status: DISPENSED
Start: 2023-02-07